# Patient Record
Sex: FEMALE | Race: WHITE | URBAN - METROPOLITAN AREA
[De-identification: names, ages, dates, MRNs, and addresses within clinical notes are randomized per-mention and may not be internally consistent; named-entity substitution may affect disease eponyms.]

---
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
## 2024-05-28 ENCOUNTER — OFFICE VISIT (OUTPATIENT)
Dept: INTERNAL MEDICINE CLINIC | Facility: CLINIC | Age: 19
End: 2024-05-28

## 2024-05-28 ENCOUNTER — TELEPHONE (OUTPATIENT)
Dept: INTERNAL MEDICINE CLINIC | Facility: CLINIC | Age: 19
End: 2024-05-28

## 2024-05-28 VITALS
HEIGHT: 66 IN | DIASTOLIC BLOOD PRESSURE: 72 MMHG | OXYGEN SATURATION: 99 % | SYSTOLIC BLOOD PRESSURE: 114 MMHG | BODY MASS INDEX: 21.97 KG/M2 | TEMPERATURE: 98 F | RESPIRATION RATE: 16 BRPM | HEART RATE: 76 BPM | WEIGHT: 136.69 LBS

## 2024-05-28 DIAGNOSIS — Z13.220 SCREENING FOR LIPID DISORDERS: ICD-10-CM

## 2024-05-28 DIAGNOSIS — R00.0 TACHYCARDIA: ICD-10-CM

## 2024-05-28 DIAGNOSIS — Z13.29 SCREENING FOR ENDOCRINE, METABOLIC AND IMMUNITY DISORDER: ICD-10-CM

## 2024-05-28 DIAGNOSIS — Z13.29 SCREENING FOR THYROID DISORDER: ICD-10-CM

## 2024-05-28 DIAGNOSIS — Z13.0 SCREENING FOR DEFICIENCY ANEMIA: ICD-10-CM

## 2024-05-28 DIAGNOSIS — Z00.00 ENCOUNTER FOR ANNUAL PHYSICAL EXAM: ICD-10-CM

## 2024-05-28 DIAGNOSIS — Z13.228 SCREENING FOR ENDOCRINE, METABOLIC AND IMMUNITY DISORDER: ICD-10-CM

## 2024-05-28 DIAGNOSIS — R00.2 PALPITATIONS: Primary | ICD-10-CM

## 2024-05-28 DIAGNOSIS — Z13.0 SCREENING FOR ENDOCRINE, METABOLIC AND IMMUNITY DISORDER: ICD-10-CM

## 2024-05-28 LAB
ATRIAL RATE: 68 BPM
P AXIS: 68 DEGREES
P-R INTERVAL: 158 MS
Q-T INTERVAL: 412 MS
QRS DURATION: 78 MS
QTC CALCULATION (BEZET): 438 MS
R AXIS: 89 DEGREES
T AXIS: 56 DEGREES
VENTRICULAR RATE: 68 BPM

## 2024-05-28 PROCEDURE — 93000 ELECTROCARDIOGRAM COMPLETE: CPT | Performed by: NURSE PRACTITIONER

## 2024-05-28 PROCEDURE — 99214 OFFICE O/P EST MOD 30 MIN: CPT | Performed by: NURSE PRACTITIONER

## 2024-05-28 NOTE — PROGRESS NOTES
CHIEF COMPLAINT:     Chief Complaint   Patient presents with    Palpitations     Occurring since April. Recently increasing in freq, past week. Experiencing every day at least once now. Sunday, occurred 4 times, \"major\" palpitations. Durations of palpitations is 5 mins, experiences nausea, lightheaded, tingling, and feeling shaky. Denies cp/sob associated with palpitations. Not currently lightheaded or experiencing palpitations. Currently exp nausea. Endorses her resting hr is more than usual ().    Nausea    Lightheadedness     Just started today       HPI:   Peggy Sprague is a 19 year old female accompanied by her mother coming in with complaints of palpitations and tachycardia.     Symptoms have been ongoing since April but more frequent in the past week. She is an avid swimmer and is pretty active. Reports her HR goes up to 120-130s when she is walking and she starts to feel palpitations. HR usually comes down within 5 minutes of resting. She does have associated lightheadedness and nausea with the palpitations. No chest pain, shortness of breath, syncope, or vomiting. Reports her resting HR is also higher about  and was previously around 60-70s. She was previously drinking 2-3 cups of coffee weekly but has cut that out completely now. Has been drinking more water which seems to help somewhat. Denies any increased stress or anxiety. 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     Past Medical History:    History of MRI    Right hip MRI    Perthes disease, right (HCC)      Past Surgical History:   Procedure Laterality Date    Legg perthes orthosis Right 2011, 2012, 2021    3 total surgeries      Social History:  Social History     Socioeconomic History    Marital status: Single   Occupational History    Occupation:    Tobacco Use    Smoking status: Never     Passive exposure: Never    Smokeless tobacco: Never   Vaping Use    Vaping status: Never Used   Substance and Sexual Activity     Alcohol use: Never    Drug use: Never   Other Topics Concern    Caffeine Concern No    Exercise No    Seat Belt No    Special Diet No    Stress Concern No    Weight Concern No      Family History:  Family History   Problem Relation Age of Onset    Breast Cancer Paternal Grandmother     Cancer Paternal Grandmother     Other (leukimia) Paternal Grandmother 62      Allergies:  No Known Allergies   Current Meds:  Current Outpatient Medications   Medication Sig Dispense Refill    Glucosamine-Chondroitin (GLUCOSAMINE CHONDR COMPLEX OR) Take 2 capsules by mouth daily.      Multiple Vitamins-Minerals (MULTI COMPLETE OR) Take by mouth.         Counseling given: Not Answered       REVIEW OF SYSTEMS:   See HPI.    EXAM:     /72 (BP Location: Left arm, Patient Position: Sitting, Cuff Size: adult)   Pulse 76   Temp 97.5 °F (36.4 °C) (Temporal)   Resp 16   Ht 5' 6\" (1.676 m)   Wt 136 lb 11.2 oz (62 kg)   LMP 05/01/2024   SpO2 99%   BMI 22.06 kg/m²   Body mass index is 22.06 kg/m².   Vital signs reviewed. Appears stated age, well groomed, in no acute distress.  Physical Exam:  GENERAL: Patient is alert, awake and oriented, well developed, well nourished.  HEENT: Head: Normocephalic, atraumatic.   NECK: Supple, no CLAD, no bruits.  HEART: RRR without murmur.  LUNGS: Clear to auscultation bilaterally, no rales/rhonchi/wheezing.  ABDOMEN: good BS's, no masses, HSM or tenderness  MUSCULOSKELETAL: No obvious joint deformity or swelling.   EXTREMITIES: No edema, no cyanosis, no clubbing, FROM  NEURO: Oriented time three..    LABS:      Lab Results   Component Value Date    WBC 5.4 06/14/2023    RBC 4.12 06/14/2023    HGB 12.2 06/14/2023    HCT 36.2 06/14/2023    MCV 87.9 06/14/2023    MCH 29.6 06/14/2023    MCHC 33.7 06/14/2023    RDW 12.4 06/14/2023    .0 06/14/2023      Lab Results   Component Value Date    GLU 88 06/14/2023    BUN 11 06/14/2023    CREATSERUM 0.71 06/14/2023    ANIONGAP 7 06/14/2023    GFRNAA 108  07/18/2022    GFRAA 124 07/18/2022    CA 9.8 06/14/2023    OSMOCALC 289 06/14/2023    ALKPHO 68 07/18/2022    AST 20 07/18/2022    ALT 21 07/18/2022    BILT 0.5 07/18/2022    TP 7.3 07/18/2022    ALB 4.0 07/18/2022    GLOBULIN 3.3 07/18/2022     06/14/2023    K 4.4 06/14/2023     06/14/2023    CO2 24.0 06/14/2023      No results found for: \"CHOLEST\", \"TRIG\", \"HDL\", \"LDL\", \"VLDL\", \"TCHDLRATIO\", \"NONHDLC\", \"CHOLHDLRATIO\", \"CALCNONHDL\"   Lab Results   Component Value Date    TSH 0.620 06/14/2023      No results found for: \"EAG\", \"A1C\"     IMAGING:     EKG 5/28/24: NSR w/ sinus arrhythmia. No acute ST changes, normal axis.     ASSESSMENT AND PLAN:   1. Palpitations  2. Tachycardia  -EKG as above  -Do holter and echo  -Do labs  -Avoid caffeine  -Drink at least 64-80 oz of water daily  -Go to ER for worsening palpitations, chest pain, or sob  - EKG with interpretation and Report -IN OFFICE [25143]  - CARD MONITOR HOLTER 48 HOUR (CPT=93225); Future  - CARD ECHO 2D DOPPLER (CPT=93306); Future  - MAGNESIUM [622][Q]    3. Encounter for annual physical exam  -Do fasting labs  - CBC With Differential With Platelet  - Comp Metabolic Panel  - Lipid Panel  - TSH W Reflex To Free T4    4. Screening for deficiency anemia  - CBC With Differential With Platelet    5. Screening for endocrine, metabolic and immunity disorder  - Comp Metabolic Panel    6. Screening for lipid disorders  - Lipid Panel    7. Screening for thyroid disorder  - TSH W Reflex To Free T4     The patient indicates understanding of these issues and agrees to the plan.  Return for as scheduled for physical or sooner as needed.    Christy Orantes, APRKVNG  5/28/2024

## 2024-05-28 NOTE — TELEPHONE ENCOUNTER
called after scheduling patient for \"Heart palpatations accompanied by nausea. spoke to patient. Occurring since April. Recently increasing in freq, past week. Experiencing every day at least once now. Sunday, occurred 4 times, \"major\" palpitations. Durations of palpitations is 5 mins, experiences nausea, lightheaded, tingling, and feeling shaky. Denies cp/sob associated with palpitations. Not currently lightheaded or experiencing palpitations. Currently exp nausea. Endorses her resting hr is more than usual (). Does not consume much caffeine. Patient coming in for appoinment. Advised to have someone drive her. Aware to go to er for worsening symptoms or any emergent matters from what we discussed. Fyi-thanks!    Future Appointments   Date Time Provider Department Center   5/28/2024  9:40 AM Christy Orantes APRN [facility]   6/18/2024 12:00 PM Dora Russell MD [facility]

## 2024-05-28 NOTE — PATIENT INSTRUCTIONS
Get your labs done. You should be fasting for at least 10 hours (you can drink water during fasting). If you take a multivitamin with Biotin or any biotin product it should be held for 3 days prior to getting your labs done. If you use [facility] labs, you may schedule at [phone] or on-line at [email or web address].     Get the holter and echocardiogram done.    Drink at least 64-80 oz of water.    Avoid any caffeine.    Go to ER for persistent palpitations, or persistent HR >120, chest pain, or shortness of breath.

## 2024-05-29 ENCOUNTER — LAB ENCOUNTER (OUTPATIENT)
Dept: LAB | Age: 19
End: 2024-05-29
Attending: NURSE PRACTITIONER
Payer: COMMERCIAL

## 2024-05-29 LAB
ALBUMIN SERPL-MCNC: 4.2 G/DL (ref 3.4–5)
ALBUMIN/GLOB SERPL: 1.3 {RATIO} (ref 1–2)
ALP LIVER SERPL-CCNC: 53 U/L
ALT SERPL-CCNC: 16 U/L
ANION GAP SERPL CALC-SCNC: 7 MMOL/L (ref 0–18)
AST SERPL-CCNC: 17 U/L (ref 15–37)
BASOPHILS # BLD AUTO: 0.02 X10(3) UL (ref 0–0.2)
BASOPHILS NFR BLD AUTO: 0.4 %
BILIRUB SERPL-MCNC: 0.6 MG/DL (ref 0.1–2)
BUN BLD-MCNC: 12 MG/DL (ref 9–23)
CALCIUM BLD-MCNC: 9.5 MG/DL (ref 8.5–10.1)
CHLORIDE SERPL-SCNC: 109 MMOL/L (ref 98–112)
CHOLEST SERPL-MCNC: 135 MG/DL (ref ?–200)
CO2 SERPL-SCNC: 23 MMOL/L (ref 21–32)
CREAT BLD-MCNC: 0.89 MG/DL
EGFRCR SERPLBLD CKD-EPI 2021: 95 ML/MIN/1.73M2 (ref 60–?)
EOSINOPHIL # BLD AUTO: 0.12 X10(3) UL (ref 0–0.7)
EOSINOPHIL NFR BLD AUTO: 2.2 %
ERYTHROCYTE [DISTWIDTH] IN BLOOD BY AUTOMATED COUNT: 12 %
FASTING PATIENT LIPID ANSWER: YES
FASTING STATUS PATIENT QL REPORTED: YES
GLOBULIN PLAS-MCNC: 3.3 G/DL (ref 2.8–4.4)
GLUCOSE BLD-MCNC: 95 MG/DL (ref 70–99)
HCT VFR BLD AUTO: 37 %
HDLC SERPL-MCNC: 51 MG/DL (ref 40–59)
HGB BLD-MCNC: 13 G/DL
IMM GRANULOCYTES # BLD AUTO: 0.01 X10(3) UL (ref 0–1)
IMM GRANULOCYTES NFR BLD: 0.2 %
LDLC SERPL CALC-MCNC: 69 MG/DL (ref ?–100)
LYMPHOCYTES # BLD AUTO: 1.96 X10(3) UL (ref 1–4)
LYMPHOCYTES NFR BLD AUTO: 36.5 %
MAGNESIUM SERPL-MCNC: 2.1 MG/DL (ref 1.6–2.6)
MCH RBC QN AUTO: 29.5 PG (ref 26–34)
MCHC RBC AUTO-ENTMCNC: 35.1 G/DL (ref 31–37)
MCV RBC AUTO: 83.9 FL
MONOCYTES # BLD AUTO: 0.48 X10(3) UL (ref 0.1–1)
MONOCYTES NFR BLD AUTO: 8.9 %
NEUTROPHILS # BLD AUTO: 2.78 X10 (3) UL (ref 1.5–7.7)
NEUTROPHILS # BLD AUTO: 2.78 X10(3) UL (ref 1.5–7.7)
NEUTROPHILS NFR BLD AUTO: 51.8 %
NONHDLC SERPL-MCNC: 84 MG/DL (ref ?–130)
OSMOLALITY SERPL CALC.SUM OF ELEC: 288 MOSM/KG (ref 275–295)
PLATELET # BLD AUTO: 189 10(3)UL (ref 150–450)
POTASSIUM SERPL-SCNC: 4.2 MMOL/L (ref 3.5–5.1)
PROT SERPL-MCNC: 7.5 G/DL (ref 6.4–8.2)
RBC # BLD AUTO: 4.41 X10(6)UL
SODIUM SERPL-SCNC: 139 MMOL/L (ref 136–145)
TRIGL SERPL-MCNC: 74 MG/DL (ref 30–149)
TSI SER-ACNC: 1.58 MIU/ML (ref 0.36–3.74)
VLDLC SERPL CALC-MCNC: 11 MG/DL (ref 0–30)
WBC # BLD AUTO: 5.4 X10(3) UL (ref 4–11)

## 2024-05-29 PROCEDURE — 80061 LIPID PANEL: CPT | Performed by: NURSE PRACTITIONER

## 2024-05-29 PROCEDURE — 80053 COMPREHEN METABOLIC PANEL: CPT | Performed by: NURSE PRACTITIONER

## 2024-05-29 PROCEDURE — 36415 COLL VENOUS BLD VENIPUNCTURE: CPT | Performed by: NURSE PRACTITIONER

## 2024-05-29 PROCEDURE — 83735 ASSAY OF MAGNESIUM: CPT | Performed by: NURSE PRACTITIONER

## 2024-05-29 PROCEDURE — 84443 ASSAY THYROID STIM HORMONE: CPT | Performed by: NURSE PRACTITIONER

## 2024-05-29 PROCEDURE — 85025 COMPLETE CBC W/AUTO DIFF WBC: CPT | Performed by: NURSE PRACTITIONER

## 2024-05-31 ENCOUNTER — OFFICE VISIT (OUTPATIENT)
Dept: INTERNAL MEDICINE CLINIC | Facility: CLINIC | Age: 19
End: 2024-05-31

## 2024-05-31 VITALS
BODY MASS INDEX: 22.02 KG/M2 | HEIGHT: 66 IN | SYSTOLIC BLOOD PRESSURE: 104 MMHG | WEIGHT: 137 LBS | DIASTOLIC BLOOD PRESSURE: 64 MMHG | RESPIRATION RATE: 14 BRPM | OXYGEN SATURATION: 99 % | TEMPERATURE: 97 F | HEART RATE: 68 BPM

## 2024-05-31 DIAGNOSIS — R00.2 PALPITATIONS: Primary | ICD-10-CM

## 2024-05-31 DIAGNOSIS — R00.0 TACHYCARDIA: ICD-10-CM

## 2024-05-31 DIAGNOSIS — R11.0 NAUSEA WITHOUT VOMITING: ICD-10-CM

## 2024-05-31 PROCEDURE — G2211 COMPLEX E/M VISIT ADD ON: HCPCS | Performed by: NURSE PRACTITIONER

## 2024-05-31 PROCEDURE — 99214 OFFICE O/P EST MOD 30 MIN: CPT | Performed by: NURSE PRACTITIONER

## 2024-05-31 RX ORDER — METOPROLOL SUCCINATE 25 MG/1
25 TABLET, EXTENDED RELEASE ORAL DAILY
Qty: 30 TABLET | Refills: 0 | Status: SHIPPED | OUTPATIENT
Start: 2024-05-31

## 2024-05-31 RX ORDER — DIMENHYDRINATE 50 MG
50 TABLET ORAL NIGHTLY PRN
COMMUNITY

## 2024-05-31 RX ORDER — ONDANSETRON 4 MG/1
4 TABLET, ORALLY DISINTEGRATING ORAL EVERY 8 HOURS PRN
Qty: 15 TABLET | Refills: 0 | Status: SHIPPED | OUTPATIENT
Start: 2024-05-31

## 2024-05-31 NOTE — PROGRESS NOTES
CHIEF COMPLAINT:     Chief Complaint   Patient presents with    Nausea     No vomiting, nausea constant and getting worse---3 weeks now. Has testing for palpitations scheduled. -had 1 episode Wed.        HPI:   Peggy Sprague is a 19 year old female coming in accompanied by her dad with complaints of persistent nausea.    Patient denies being pregnant, has never been sexually active. Was seen on 5/28 for palpitations and tachycardia. Has holter and echo scheduled for Tuesday. Had 1 episode of palpitation on 5/29, none since then. Still has tachycardia up to 120-130s with walking. Resting is better now around 60-80s. No chest pain, shortness of breath, or syncope. Still feels lightheaded occasionally. She is not drinking any caffeine now. Drinking lots of water. Denies any exercise or strenuous activity within the last few days.    Past Medical History:    History of MRI    Right hip MRI    Perthes disease, right (HCC)      Past Surgical History:   Procedure Laterality Date    Legg perthes orthosis Right 2011, 2012, 2021    3 total surgeries      Social History:  Social History     Socioeconomic History    Marital status: Single   Occupational History    Occupation: lifeguard   Tobacco Use    Smoking status: Never     Passive exposure: Never    Smokeless tobacco: Never   Vaping Use    Vaping status: Never Used   Substance and Sexual Activity    Alcohol use: Never    Drug use: Never   Other Topics Concern    Caffeine Concern No    Exercise No    Seat Belt No    Special Diet No    Stress Concern No    Weight Concern No      Family History:  Family History   Problem Relation Age of Onset    Breast Cancer Paternal Grandmother     Cancer Paternal Grandmother     Other (leukimia) Paternal Grandmother 62      Allergies:  No Known Allergies   Current Meds:  Current Outpatient Medications   Medication Sig Dispense Refill    dimenhyDRINATE 50 MG Oral Tab Take 1 tablet (50 mg total) by mouth nightly as needed.       metoprolol succinate ER 25 MG Oral Tablet 24 Hr Take 1 tablet (25 mg total) by mouth daily. 30 tablet 0    ondansetron 4 MG Oral Tablet Dispersible Take 1 tablet (4 mg total) by mouth every 8 (eight) hours as needed for Nausea. 15 tablet 0    Glucosamine-Chondroitin (GLUCOSAMINE CHONDR COMPLEX OR) Take 2 capsules by mouth daily.      Multiple Vitamins-Minerals (MULTI COMPLETE OR) Take by mouth.         Counseling given: Not Answered       REVIEW OF SYSTEMS:   See HPI.    EXAM:     /64 (BP Location: Right arm, Patient Position: Sitting, Cuff Size: adult)   Pulse 68   Temp 97.4 °F (36.3 °C) (Skin)   Resp 14   Ht 5' 6\" (1.676 m)   Wt 137 lb (62.1 kg)   LMP 05/01/2024   SpO2 99%   BMI 22.11 kg/m²   Body mass index is 22.11 kg/m².   Vital signs reviewed. Appears stated age, well groomed, in no acute distress.  Physical Exam:  GENERAL: Patient is alert, awake and oriented, well developed, well nourished.  HEENT: Head: Normocephalic, atraumatic.   NECK: Supple, no CLAD, no bruits.   HEART: RRR without murmur.  LUNGS: Clear to auscultation bilaterally, no rales/rhonchi/wheezing.  ABDOMEN: good BS's, no masses, HSM or tenderness  MUSCULOSKELETAL: No obvious joint deformity or swelling.   EXTREMITIES: No edema, no cyanosis, no clubbing, FROM.  NEURO: Oriented time three.     LABS:      Lab Results   Component Value Date    WBC 5.4 05/29/2024    RBC 4.41 05/29/2024    HGB 13.0 05/29/2024    HCT 37.0 05/29/2024    MCV 83.9 05/29/2024    MCH 29.5 05/29/2024    MCHC 35.1 05/29/2024    RDW 12.0 05/29/2024    .0 05/29/2024      Lab Results   Component Value Date    GLU 95 05/29/2024    BUN 12 05/29/2024    CREATSERUM 0.89 05/29/2024    ANIONGAP 7 05/29/2024    GFRNAA 108 07/18/2022    GFRAA 124 07/18/2022    CA 9.5 05/29/2024    OSMOCALC 288 05/29/2024    ALKPHO 53 05/29/2024    AST 17 05/29/2024    ALT 16 05/29/2024    BILT 0.6 05/29/2024    TP 7.5 05/29/2024    ALB 4.2 05/29/2024    GLOBULIN 3.3 05/29/2024      05/29/2024    K 4.2 05/29/2024     05/29/2024    CO2 23.0 05/29/2024      Lab Results   Component Value Date    CHOLEST 135 05/29/2024    TRIG 74 05/29/2024    HDL 51 05/29/2024    LDL 69 05/29/2024    VLDL 11 05/29/2024    NONHDLC 84 05/29/2024      Lab Results   Component Value Date    TSH 1.580 05/29/2024      No results found for: \"EAG\", \"A1C\"     IMAGING:     No results found.     ASSESSMENT AND PLAN:   1. Palpitations  2. Tachycardia  -She is still symptomatic  -Start metoprolol succinate 25 mg daily, side effects discussed. Hold 48 hours before her test  -Advised to monitor her HR closely and notify us if persistently <60  -Continue to drink adequate water  -Avoid any exercise for now  -Continue to avoid caffeine   - metoprolol succinate ER 25 MG Oral Tablet 24 Hr; Take 1 tablet (25 mg total) by mouth daily.  Dispense: 30 tablet; Refill: 0    3. Nausea without vomiting  -Take zofran prn. EKG done 5/28 with no QT prolongation   - ondansetron 4 MG Oral Tablet Dispersible; Take 1 tablet (4 mg total) by mouth every 8 (eight) hours as needed for Nausea.  Dispense: 15 tablet; Refill: 0     The patient indicates understanding of these issues and agrees to the plan.  Return for as scheduled or sooner as needed.    Christy Orantes, ISSA  5/31/2024

## 2024-06-04 ENCOUNTER — TELEPHONE (OUTPATIENT)
Dept: INTERNAL MEDICINE CLINIC | Facility: CLINIC | Age: 19
End: 2024-06-04

## 2024-06-04 ENCOUNTER — HOSPITAL ENCOUNTER (OUTPATIENT)
Dept: CV DIAGNOSTICS | Facility: HOSPITAL | Age: 19
Discharge: HOME | End: 2024-06-04
Attending: NURSE PRACTITIONER
Payer: COMMERCIAL

## 2024-06-04 DIAGNOSIS — R00.0 TACHYCARDIA: ICD-10-CM

## 2024-06-04 DIAGNOSIS — R00.2 PALPITATIONS: ICD-10-CM

## 2024-06-04 DIAGNOSIS — R11.0 NAUSEA WITHOUT VOMITING: ICD-10-CM

## 2024-06-04 PROCEDURE — 93226 XTRNL ECG REC<48 HR SCAN A/R: CPT | Performed by: NURSE PRACTITIONER

## 2024-06-04 PROCEDURE — 93306 TTE W/DOPPLER COMPLETE: CPT | Performed by: NURSE PRACTITIONER

## 2024-06-04 PROCEDURE — 93225 XTRNL ECG REC<48 HRS REC: CPT | Performed by: NURSE PRACTITIONER

## 2024-06-04 RX ORDER — ONDANSETRON 4 MG/1
4 TABLET, ORALLY DISINTEGRATING ORAL
Qty: 15 TABLET | Refills: 0 | Status: SHIPPED | OUTPATIENT
Start: 2024-06-04

## 2024-06-04 NOTE — TELEPHONE ENCOUNTER
Pt informed and verbalized understanding. Cardiac testing completed, advised we will be back in touch with results once reviewed by provider.

## 2024-06-04 NOTE — TELEPHONE ENCOUNTER
We can do a refill for 15 tablets. Advise her to take only as needed. Try seeing if taking once a day is sufficient. Thanks.

## 2024-06-04 NOTE — TELEPHONE ENCOUNTER
Was prescribed Zolfran earlier this week and has finished the prescription. Pt would like to know if she needs more or if that was enough. Please call her back    [phone]

## 2024-06-04 NOTE — TELEPHONE ENCOUNTER
Spoke to pt, she was only dispensed 9 tablets, not 15 but she has used all of these. She was taking twice a day, seemed to help symptoms a little but nausea still persistent. No vomiting or abd pain. She is wondering if she is to continue this as needed can she get refill? Please advise, thank you.

## 2024-06-11 ENCOUNTER — TELEPHONE (OUTPATIENT)
Dept: INTERNAL MEDICINE CLINIC | Facility: CLINIC | Age: 19
End: 2024-06-11

## 2024-06-11 DIAGNOSIS — R00.0 TACHYCARDIA: ICD-10-CM

## 2024-06-11 DIAGNOSIS — R00.2 PALPITATIONS: Primary | ICD-10-CM

## 2024-06-11 DIAGNOSIS — R94.31 ABNORMAL HOLTER EXAM: ICD-10-CM

## 2024-06-11 NOTE — TELEPHONE ENCOUNTER
Can we call cardiology to get holter monitor results? I recommend she follow up with cardiology as well. Please refer to [facility]. Thanks.

## 2024-06-11 NOTE — TELEPHONE ENCOUNTER
Discussed with Dr. Russell. She needs an appointment for a re-evaluation of the nausea and dizziness to r/o other etiologies other than palpitations and tachycardia contributing to her symptoms. The holter is not finalized yet. I looked at the preliminary results which shows sinus arrhythmia with mild sinus tachycardia that resolved abruptly. Rare PVC and PAC but nothing significant. There were no symptoms she recorded in the diary. She should see cardiology regardless. She has an upcoming appointment on 6/18 which can be changed to address this acute concern and she can reschedule the physical. If she does not want to reschedule the physical or would like to come in sooner, she can see Adilene or Dr. Russell as I am out of office from tomorrow and will be back Monday. If no availability with either, she can see Dr. Roper. Thanks.

## 2024-06-11 NOTE — TELEPHONE ENCOUNTER
Per Dr delarosa Ok to add on tomorrow to her schedule at 11am if pt is able to come. Left message to call back for pt and pt's mom.

## 2024-06-11 NOTE — TELEPHONE ENCOUNTER
Pt's mom calling with the concern for the pt. Pt has severe lightheaded, dizziness and nausea with no vomiting and sometimes palpitations. Zofran helping a little. Pt been having these systems for 6 months now. Pt waiting Holter monitor result.  Pt is laying down all the time and does not have energy. Please advise

## 2024-06-11 NOTE — TELEPHONE ENCOUNTER
Patient's mom notified.  verbalized understanding . Appointment made with Adilene. Cardio information given. Referral, all cardio tests  faxed to [facility]     Future Appointments   Date Time Provider Department Center   6/12/2024  7:40 AM Luisana Hurtado APRN [facility]   6/18/2024 12:00 PM Dora Russell MD [facility]

## 2024-06-12 ENCOUNTER — LAB ENCOUNTER (OUTPATIENT)
Dept: LAB | Age: 19
End: 2024-06-12
Attending: INTERNAL MEDICINE
Payer: COMMERCIAL

## 2024-06-12 ENCOUNTER — OFFICE VISIT (OUTPATIENT)
Dept: INTERNAL MEDICINE CLINIC | Facility: CLINIC | Age: 19
End: 2024-06-12
Payer: COMMERCIAL

## 2024-06-12 VITALS
OXYGEN SATURATION: 98 % | DIASTOLIC BLOOD PRESSURE: 66 MMHG | TEMPERATURE: 98 F | SYSTOLIC BLOOD PRESSURE: 110 MMHG | RESPIRATION RATE: 21 BRPM | WEIGHT: 134 LBS | BODY MASS INDEX: 21.53 KG/M2 | HEART RATE: 67 BPM | HEIGHT: 66 IN

## 2024-06-12 DIAGNOSIS — R11.0 NAUSEA: ICD-10-CM

## 2024-06-12 DIAGNOSIS — R42 LIGHTHEADEDNESS: Primary | ICD-10-CM

## 2024-06-12 LAB
FOLATE SERPL-MCNC: 20.9 NG/ML (ref 8.7–?)
VIT B12 SERPL-MCNC: 405 PG/ML (ref 193–986)
VIT D+METAB SERPL-MCNC: 43 NG/ML (ref 30–100)

## 2024-06-12 PROCEDURE — 99214 OFFICE O/P EST MOD 30 MIN: CPT | Performed by: INTERNAL MEDICINE

## 2024-06-12 PROCEDURE — 82607 VITAMIN B-12: CPT | Performed by: INTERNAL MEDICINE

## 2024-06-12 PROCEDURE — 36415 COLL VENOUS BLD VENIPUNCTURE: CPT | Performed by: INTERNAL MEDICINE

## 2024-06-12 PROCEDURE — 82306 VITAMIN D 25 HYDROXY: CPT | Performed by: INTERNAL MEDICINE

## 2024-06-12 PROCEDURE — 82746 ASSAY OF FOLIC ACID SERUM: CPT | Performed by: INTERNAL MEDICINE

## 2024-06-12 NOTE — PROGRESS NOTES
[facility]    CHIEF COMPLAINT:    Chief Complaint   Patient presents with    Dizziness     Dizzines    Follow - Up     Still has dizziness, nausea,lightheaded and sometimes Palpitation feels like Zofran is not wking for symptoms.Holter monitor result came back abnormal.         HISTORY OF PRESENT ILLNESS:  here for follow up.   Seen in May for palpitations and lightheadedness and then again for nausea. Symptoms have persisted.   Started in April with palpitations. Would occur randomly. Would cause lightheadedness but not severe. Lasted a few minutes and passed.   These resolved and then a few days later she came back with nausea.   Now with persistent nausea and lightheadedness.     Daily symptoms. Constant but worsen with some activities, she cannot really pinpoint what. Usually worse in the am. Not positional or exertional.   In the am she is often very lightheaded. Feels like she may pass out but has not. Gets better as the day goes on.   Yesterday was a bad day. She had to lie in bed all day for her symptoms. Today is better.   No vomiting.   No abdominal pain, diarrhea. No blood in bm.   No headache, vision changes, numbness, tingling, weakness, speech difficulty. No neck or back pain.     LMP 6/6/24, before that 5/1/24. Menses are regular.   No vaginal or urinary symptoms.     No sinus symptoms.     No joint or muscle aches. No unusual rashes.    Denies depression or anxiety.      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 Has not been swimming since she came home in May due to her symptoms. Doesn't feel comfortable being in the pool.     She had labs that were unremarkable.   Had a holter that was reviewed. Showed sinus tachycardia at times. She didn't have any palpitations so did not record any symptoms. She had nausea and lightheadedness but didn't know she was supposed to record those.     She has a cardiology appt later this month.     Orthostatics:   Supine: bp 108/62 p60  Sitting: bp 106/64 p63  Standing: bp  102/64 p75    REVIEW OF SYSTEMS:  See HPI    Current Medications:    Current Outpatient Medications   Medication Sig Dispense Refill    ondansetron 4 MG Oral Tablet Dispersible Take 1 tablet (4 mg total) by mouth daily as needed for Nausea. 15 tablet 0    dimenhyDRINATE 50 MG Oral Tab Take 1 tablet (50 mg total) by mouth nightly as needed.      Glucosamine-Chondroitin (GLUCOSAMINE CHONDR COMPLEX OR) Take 2 capsules by mouth daily.      Multiple Vitamins-Minerals (MULTI COMPLETE OR) Take by mouth.      metoprolol succinate ER 25 MG Oral Tablet 24 Hr Take 1 tablet (25 mg total) by mouth daily. 30 tablet 0       PAST MEDICAL, SOCIAL, AND FAMILY HISTORY:  Tobacco:    History   Smoking Status    Never   Smokeless Tobacco    Never       PHYSICAL EXAM:  /66 (BP Location: Left arm, Patient Position: Sitting, Cuff Size: adult)   Pulse 67   Temp 97.8 °F (36.6 °C)   Resp 21   Ht 5' 6\" (1.676 m)   Wt 134 lb (60.8 kg)   LMP 06/06/2024   SpO2 98%   BMI 21.63 kg/m²    GENERAL: well developed, well nourished,in no apparent distress  SKIN: no rashes,no suspicious lesions  HEENT: Oropharynx normal. No tonsillar enlargement or exudates. TMs normal bilaterally.   EYES:PERRLA, EOMI, conjunctiva are clear  NECK: no lad, supple, no carotid bruit  LUNGS: clear to auscultation  CARDIO: RRR without murmur  GI: good BS's,no masses, HSM or tenderness  MUSCULOSKELETAL: back is not tender,FROM of the back  EXTREMITIES: no cyanosis, clubbing or edema  NEURO: Oriented times three, cranial nerves are intact, motor and sensory are grossly intact, DTR's are 2+, strength is 5+/5 and sensation is intact to pinprick, and finger to nose, heel to shin and Rhomberg are all wnl's  Barb hallpike is negative.     DATA:  Results for orders placed or performed in visit on 05/28/24   Comp Metabolic Panel   Result Value Ref Range    Glucose 95 70 - 99 mg/dL    Sodium 139 136 - 145 mmol/L    Potassium 4.2 3.5 - 5.1 mmol/L    Chloride 109 98 - 112  mmol/L    CO2 23.0 21.0 - 32.0 mmol/L    Anion Gap 7 0 - 18 mmol/L    BUN 12 9 - 23 mg/dL    Creatinine 0.89 0.55 - 1.02 mg/dL    Calcium, Total 9.5 8.5 - 10.1 mg/dL    Calculated Osmolality 288 275 - 295 mOsm/kg    eGFR-Cr 95 >=60 mL/min/1.73m2    AST 17 15 - 37 U/L    ALT 16 13 - 56 U/L    Alkaline Phosphatase 53 52 - 144 U/L    Bilirubin, Total 0.6 0.1 - 2.0 mg/dL    Total Protein 7.5 6.4 - 8.2 g/dL    Albumin 4.2 3.4 - 5.0 g/dL    Globulin  3.3 2.8 - 4.4 g/dL    A/G Ratio 1.3 1.0 - 2.0    Patient Fasting for CMP? Yes    Lipid Panel   Result Value Ref Range    Cholesterol, Total 135 <200 mg/dL    HDL Cholesterol 51 40 - 59 mg/dL    Triglycerides 74 30 - 149 mg/dL    LDL Cholesterol 69 <100 mg/dL    VLDL 11 0 - 30 mg/dL    Non HDL Chol 84 <130 mg/dL    Patient Fasting for Lipid? Yes    TSH W Reflex To Free T4   Result Value Ref Range    TSH 1.580 0.358 - 3.740 mIU/mL   MAGNESIUM [622][Q]   Result Value Ref Range    Magnesium 2.1 1.6 - 2.6 mg/dL   EKG with interpretation and Report -IN OFFICE [29255]   Result Value Ref Range    Ventricular rate 68 BPM    Atrial rate 68 BPM    P-R Interval 158 ms    QRS Duration 78 ms    Q-T Interval 412 ms    QTC Calculation (Bezet) 438 ms    P Axis 68 degrees    R Axis 89 degrees    T Axis 56 degrees   CBC W/ DIFFERENTIAL   Result Value Ref Range    WBC 5.4 4.0 - 11.0 x10(3) uL    RBC 4.41 3.80 - 5.30 x10(6)uL    HGB 13.0 12.0 - 16.0 g/dL    HCT 37.0 35.0 - 48.0 %    .0 150.0 - 450.0 10(3)uL    MCV 83.9 80.0 - 100.0 fL    MCH 29.5 26.0 - 34.0 pg    MCHC 35.1 31.0 - 37.0 g/dL    RDW 12.0 %    Neutrophil Absolute Prelim 2.78 1.50 - 7.70 x10 (3) uL    Neutrophil Absolute 2.78 1.50 - 7.70 x10(3) uL    Lymphocyte Absolute 1.96 1.00 - 4.00 x10(3) uL    Monocyte Absolute 0.48 0.10 - 1.00 x10(3) uL    Eosinophil Absolute 0.12 0.00 - 0.70 x10(3) uL    Basophil Absolute 0.02 0.00 - 0.20 x10(3) uL    Immature Granulocyte Absolute 0.01 0.00 - 1.00 x10(3) uL    Neutrophil % 51.8 %     Lymphocyte % 36.5 %    Monocyte % 8.9 %    Eosinophil % 2.2 %    Basophil % 0.4 %    Immature Granulocyte % 0.2 %            ASSESSMENT AND PLAN:  1. Lightheadedness  Unclear etiology. Work up so far is negative.   Do carotid doppler and labs.   See neuro for evaluation.   - US CAROTID DOPPLER BILAT - DIAG IMG (CPT=93880); Future  - Vitamin B12 [E]  - Folic Acid Serum (Folate)  - VITAMIN D, 25-HYDROXY [60798][Q]  - NEURO - INTERNAL    2. Nausea  Unclear etiology. Doesn't sound like a gi symptom, seems to be more related to her lightheadedness.   Appetite not affected. Able to eat and drink everything.   Monitor.   Has zofran but doesn't really help. She can stop the zofran.   If neuro work up negative may need to consider gi work up.         Return in about 2 months (around 8/12/2024) for physical, needs it for school. .      Dora Russell MD

## 2024-06-13 ENCOUNTER — TELEPHONE (OUTPATIENT)
Dept: INTERNAL MEDICINE CLINIC | Facility: CLINIC | Age: 19
End: 2024-06-13

## 2024-06-13 DIAGNOSIS — R42 LIGHTHEADEDNESS: ICD-10-CM

## 2024-06-13 DIAGNOSIS — R11.0 NAUSEA WITHOUT VOMITING: ICD-10-CM

## 2024-06-13 DIAGNOSIS — R11.0 NAUSEA: Primary | ICD-10-CM

## 2024-06-13 RX ORDER — ONDANSETRON 4 MG/1
4 TABLET, ORALLY DISINTEGRATING ORAL
Qty: 15 TABLET | Refills: 0 | Status: SHIPPED | OUTPATIENT
Start: 2024-06-13 | End: 2024-07-01

## 2024-06-13 NOTE — TELEPHONE ENCOUNTER
She can try the zofran and see if it helps her nausea now that she is having it. If it is persistent and she is not feeling better, then go to the ER. Maybe able to expedite care there as well for CT.

## 2024-06-13 NOTE — TELEPHONE ENCOUNTER
Spoke to pt regarding labs, she was seen by  yesterday and advised to stop zofran as pt didn't seem to see improvement with it and to do 1/2 tablet of metoprolol in case the HR is contributing to symptoms. She took this around 8:30 after breakfast, since then she has had increased nausea, HR around the 90s. She is continuing to monitor HR. She is open to trying zofran again if warranted, she is getting a CT and further labs for workup as well. /Christy out of office currently which she understands, please advise if able. Thank you!!

## 2024-06-13 NOTE — TELEPHONE ENCOUNTER
Patients Marcelo woods [phone] states for CT ABDOMEN+PELVIS(CONTRAST ONLY)(CPT=74177) ([id number]) [[id number]] (Order [id number]) scan is it hydration or dehydration.

## 2024-06-13 NOTE — TELEPHONE ENCOUNTER
Rec call back from central scheduling supervisor ( non clinical). She believes the question patient rec when scheduling was related to iv fluid hydration. Advised to call back and schedule and let them no this is done without iv fluid hydration. Fyi-thanks!

## 2024-06-14 ENCOUNTER — LAB ENCOUNTER (OUTPATIENT)
Dept: LAB | Age: 19
End: 2024-06-14
Attending: INTERNAL MEDICINE
Payer: COMMERCIAL

## 2024-06-14 LAB — CORTIS SERPL-MCNC: 23.8 UG/DL

## 2024-06-14 PROCEDURE — 36415 COLL VENOUS BLD VENIPUNCTURE: CPT | Performed by: INTERNAL MEDICINE

## 2024-06-14 PROCEDURE — 82533 TOTAL CORTISOL: CPT | Performed by: INTERNAL MEDICINE

## 2024-06-22 ENCOUNTER — HOSPITAL ENCOUNTER (OUTPATIENT)
Dept: ULTRASOUND IMAGING | Age: 19
Discharge: HOME | End: 2024-06-22
Attending: INTERNAL MEDICINE

## 2024-06-22 DIAGNOSIS — R42 LIGHTHEADEDNESS: ICD-10-CM

## 2024-06-22 PROCEDURE — 93880 EXTRACRANIAL BILAT STUDY: CPT | Performed by: INTERNAL MEDICINE

## 2024-06-25 ENCOUNTER — HOSPITAL ENCOUNTER (OUTPATIENT)
Dept: CT IMAGING | Age: 19
Discharge: HOME | End: 2024-06-25
Attending: INTERNAL MEDICINE

## 2024-06-25 DIAGNOSIS — R11.0 NAUSEA: ICD-10-CM

## 2024-06-25 PROCEDURE — 74177 CT ABD & PELVIS W/CONTRAST: CPT | Performed by: INTERNAL MEDICINE

## 2024-07-01 DIAGNOSIS — R11.0 NAUSEA WITHOUT VOMITING: ICD-10-CM

## 2024-07-01 NOTE — TELEPHONE ENCOUNTER
Is this medication prescribed by the [facility] Providers? yes    Did the patient contact the pharmacy directly?:  no    Is patient out of meds or supply very low?:  2 pills    Medication Requested:  ondansetron 4 MG Oral Tablet Dispersible     Dose:      Is patient requesting a 30 or 90 day supply?:      Pharmacy name and phone # or location:    [id number] - [place], [place] - [address] AT [facility] [address], [phone], [phone] [address] [facility] [address]   Phone: [phone] Fax: [phone]       Is the patient due for an appointment?: no  (if so, please schedule appt)    Additional Notes:      Please advise the patient refills take up to 72 business hours.

## 2024-07-02 RX ORDER — ONDANSETRON 4 MG/1
4 TABLET, ORALLY DISINTEGRATING ORAL
Qty: 15 TABLET | Refills: 0 | Status: SHIPPED | OUTPATIENT
Start: 2024-07-02

## 2024-07-02 NOTE — TELEPHONE ENCOUNTER
Last OV relevant to medication: 6/12/24  Last refill date: 6/13/24 #15/refills:   When pt was asked to return for OV: 8/12/24  Upcoming appt/reason:   Future Appointments   Date Time Provider Department Center   7/31/2024  8:40 AM Tristin Heath MD [facility] [place]   8/5/2024  2:40 PM Dora Russell MD [facility] [place]   Was pt informed of any over due labs: utd

## 2024-07-16 ENCOUNTER — ORDER TRANSCRIPTION (OUTPATIENT)
Dept: ADMINISTRATIVE | Facility: HOSPITAL | Age: 19
End: 2024-07-16

## 2024-07-16 DIAGNOSIS — R42 LIGHTHEADEDNESS: Primary | ICD-10-CM

## 2024-07-16 DIAGNOSIS — R11.0 NAUSEA: ICD-10-CM

## 2024-07-16 DIAGNOSIS — R00.2 PALPITATIONS: ICD-10-CM

## 2024-07-17 ENCOUNTER — TELEPHONE (OUTPATIENT)
Dept: INTERNAL MEDICINE CLINIC | Facility: CLINIC | Age: 19
End: 2024-07-17

## 2024-07-17 NOTE — TELEPHONE ENCOUNTER
Future Appointments   Date Time Provider Department Center   7/31/2024  8:40 AM Tristin Heath MD [facility] [place]   8/5/2024  2:40 PM Dora Russell MD [facility] [place]   8/21/2024  2:00 PM  [facility]  [facility]     left detailed message for the pt

## 2024-07-17 NOTE — TELEPHONE ENCOUNTER
Reviewed cardiology notes.   Looks like pt with worsening symptoms and now with arreola as well. Cardiology ordered testing to rule out PE. I'm seeing her test scheduled for 8/21/24 this is more than a month ago. I would not recommend waiting this long for the test. Recommend she reach out to cardiology to see if they can get her scheduled sooner.   Please inform pt.

## 2024-07-19 NOTE — TELEPHONE ENCOUNTER
She already saw the cardio specialist. They ordered a test for her which is scheduled for 8/21/24. If she cannot get this test done sooner than that she should reach back out to the cardiologist to see if they can get her in sooner for the testing as they likely do not want her to wait for a month for this test. Please speak to pt and explain this to her.

## 2024-07-31 ENCOUNTER — OFFICE VISIT (OUTPATIENT)
Dept: NEUROLOGY | Facility: CLINIC | Age: 19
End: 2024-07-31
Payer: COMMERCIAL

## 2024-07-31 ENCOUNTER — TELEPHONE (OUTPATIENT)
Dept: NEUROLOGY | Facility: CLINIC | Age: 19
End: 2024-07-31

## 2024-07-31 VITALS
SYSTOLIC BLOOD PRESSURE: 96 MMHG | RESPIRATION RATE: 16 BRPM | BODY MASS INDEX: 21.53 KG/M2 | HEART RATE: 78 BPM | WEIGHT: 134 LBS | DIASTOLIC BLOOD PRESSURE: 56 MMHG | HEIGHT: 66 IN

## 2024-07-31 DIAGNOSIS — R00.2 PALPITATIONS: ICD-10-CM

## 2024-07-31 DIAGNOSIS — R55 POSTURAL DIZZINESS WITH PRESYNCOPE: ICD-10-CM

## 2024-07-31 DIAGNOSIS — R20.2 PARESTHESIAS: Primary | ICD-10-CM

## 2024-07-31 DIAGNOSIS — R42 DIZZINESS AND GIDDINESS: ICD-10-CM

## 2024-07-31 DIAGNOSIS — R55 POSTURAL DIZZINESS WITH PRESYNCOPE: Primary | ICD-10-CM

## 2024-07-31 DIAGNOSIS — R42 POSTURAL DIZZINESS WITH PRESYNCOPE: Primary | ICD-10-CM

## 2024-07-31 DIAGNOSIS — R11.0 NAUSEA: ICD-10-CM

## 2024-07-31 DIAGNOSIS — R42 POSTURAL DIZZINESS WITH PRESYNCOPE: ICD-10-CM

## 2024-07-31 PROCEDURE — 99204 OFFICE O/P NEW MOD 45 MIN: CPT | Performed by: OTHER

## 2024-07-31 NOTE — PROGRESS NOTES
Orthostatics completed per Dr. Heath    Supine: /62                P 73  Sitting: /64   P 78  Standing: BP 92/60   P 92    Pt noted lightheadedness w/ supine to sitting

## 2024-07-31 NOTE — PATIENT INSTRUCTIONS
Refill policies:    Allow 2-3 business days for refills; controlled substances may take longer.  Contact your pharmacy at least 5 days prior to running out of medication and have them send an electronic request or submit request through the “request refill” option in your [employer] account.  Refills are not addressed on weekends; covering physicians do not authorize routine medications on weekends.  No narcotics or controlled substances are refilled after noon on Fridays or by on call physicians.  By law, narcotics must be electronically prescribed.  A 30 day supply with no refills is the maximum allowed.  If your prescription is due for a refill, you may be due for a follow up appointment.  To best provide you care, patients receiving routine medications need to be seen at least once a year.  Patients receiving narcotic/controlled substance medications need to be seen at least once every 3 months.  In the event that your preferred pharmacy does not have the requested medication in stock (e.g. Backordered), it is your responsibility to find another pharmacy that has the requested medication available.  We will gladly send a new prescription to that pharmacy at your request.    Scheduling Tests:    If your physician has ordered radiology tests such as MRI or CT scans, please contact Central Scheduling at [phone] right away to schedule the test.  Once scheduled, the [facility] Centralized Referral Team will work with your insurance carrier to obtain pre-certification or prior authorization.  Depending on your insurance carrier, approval may take 3-10 days.  It is highly recommended patients assure they have received an authorization before having a test performed.  If test is done without insurance authorization, patient may be responsible for the entire amount billed.      Precertification and Prior Authorizations:  If your physician has recommended that you have a procedure or additional testing performed the [facility]  Centralized Referral Team will contact your insurance carrier to obtain pre-certification or prior authorization.    You are strongly encouraged to contact your insurance carrier to verify that your procedure/test has been approved and is a COVERED benefit.  Although the [facility] Centralized Referral Team does its due diligence, the insurance carrier gives the disclaimer that \"Although the procedure is authorized, this does not guarantee payment.\"    Ultimately the patient is responsible for payment.   Thank you for your understanding in this matter.  Paperwork Completion:  If you require FMLA or disability paperwork for your recovery, please make sure to either drop it off or have it faxed to our office at [phone]. Be sure the form has your name and date of birth on it.  The form will be faxed to our Forms Department and they will complete it for you.  There is a 25$ fee for all forms that need to be filled out.  Please be aware there is a 10-14 day turnaround time.  You will need to sign a release of information (CHRISTOPHER) form if your paperwork does not come with one.  You may call the Forms Department with any questions at [phone].  Their fax number is [phone].

## 2024-07-31 NOTE — TELEPHONE ENCOUNTER
Patient mother calling please let  know that Dr. Felton Julian office have the service for Tilt Test.    Please placed an order and fax it at [phone]  When done can we pleases call mother to let her know it was fax so she can call and make the appointment thanks.

## 2024-07-31 NOTE — H&P
[facility] New Patient / Consult Visit    Peggy Sprague is a 19 year old female.                         Referring MD: Dora Russell    Chief Complaint   Patient presents with    Neurologic Problem     Referred by PCP, C/O palpitations/dizziness/lightheaded/\"shakey\"/nausea, notes Sx's related to orthostatic changes, LOV Cards feels \"not cardiac related\"    Test Results     Echo 6/4/24, Holter 6/8/24, US Carotid 6/22       HPI:    Peggy Sprague is a 19 year old, who presents for evaluation of dizziness/ light headed sensation, palpitations, and nausea.    Patient states starting May 2024 she began to notice palpitations at rest when she was sitting in class.  This was also associated with some nausea but progressed since she moved back home to the point she is having frequent light headed sensation along with feeling like she is going to pass out when she stands up or when she is sitting up; this improves slightly if gets up slower.  She notes heart rate may get up to \"120\" when walking to bathroom and her resting heart rate is in the 60s or 70s..     She also endorses frequent nausea and light headed sensation.   She denies balance issues or numbness in feet but has occasional tingling in hands. She denies history of concussion or waking in AM having wet the bed or bitten her tongue. She has intermittent aching over the outside of the chest; denies bowel / bladder incontinence; denies headaches upon standing or with exertion.   Otherwise, patient denies any recent weight change, fevers, chills, nausea, double vision/ blurry vision / loss of vision, chest pain, palpitations, shortness of breath, rashes, joint pains, bowel / bladder incontinence or mood issues.     Past Medical History:    History of MRI    Right hip MRI    Perthes disease, right (HCC)     Past Surgical History:   Procedure Laterality Date    Legg perthes orthosis Right 2011, 2012, 2021    3 total surgeries     Social History      Socioeconomic History    Marital status: Single   Occupational History    Occupation: lifeguard   Tobacco Use    Smoking status: Never     Passive exposure: Never    Smokeless tobacco: Never   Vaping Use    Vaping status: Never Used   Substance and Sexual Activity    Alcohol use: Never    Drug use: Never   Other Topics Concern    Caffeine Concern No    Exercise No    Seat Belt No    Special Diet No    Stress Concern No    Weight Concern No     Family History   Problem Relation Age of Onset    Breast Cancer Paternal Grandmother     Cancer Paternal Grandmother     Other (leukimia) Paternal Grandmother 62       Allergies:  No Known Allergies   Current Meds:  Current Outpatient Medications   Medication Sig Dispense Refill    dimenhyDRINATE 50 MG Oral Tab Take 1 tablet (50 mg total) by mouth nightly as needed.      Glucosamine-Chondroitin (GLUCOSAMINE CHONDR COMPLEX OR) Take 2 capsules by mouth daily.      Multiple Vitamins-Minerals (MULTI COMPLETE OR) Take by mouth.            ROS:   A comprehensive 10 point review of systems was completed.  Pertinent positives and negatives noted in the the HPI.      PHYSICAL EXAM:   BP 96/56 (BP Location: Left arm, Patient Position: Sitting, Cuff Size: adult)   Pulse 78   Resp 16   Ht 66\"   Wt 134 lb (60.8 kg)   LMP 07/04/2024 (Exact Date)   BMI 21.63 kg/m²   Estimated body mass index is 21.63 kg/m² as calculated from the following:    Height as of this encounter: 66\".    Weight as of this encounter: 134 lb (60.8 kg).    GENERAL: well developed, well nourished, in no apparent distress  SKIN: no rashes  EYES: sclera anicteric, conjunctiva normal  HEENT: normocephalic  CARDIOVASCULAR: S1, S2 normal, RRR  LUNGS: clear to auscultation bilaterally  EXTREMITIES: no cyanosis, peripheral pulses intact    Neck: Supple; full range of motion; no carotid bruits    Mental status:  Alert and oriented to time, place, person, and situation  Speech: fluent  Language: normal naming,  repetition, and comprehension  Memory: normal  Attention/concentration: normal    Fundoscopic Exam: optic discs sharp bilaterally    Cranial Nerves: II through XII  Optic:    Pupils: equally round and reactive to light with direct and consensual responses, normal accomodation   Visual acuity: Normal              Visual fields: Normal  Oculomotor/Trochlear/Abducens:    Eye Movements: EOMI without nystagmus  Trigeminal:   Facial sensation:intact to light touch bilaterally  Facial:   Smile symmetric, eyebrow raise symmetric  Vestibulocochlear:   Hearing: normal bilaterally  Glossopharyngeal/Vagus:   Palate elevates symmetrically with midline uvula  Spinal accessory:   Shoulder Shrug: normal bilaterally   Lateral head turn: normal bilaterally  Hypoglossal:   Tongue movement: protrusion is midline with normal lateral movements    Motor System:  Strength: 5/5 throughout  Tone: normal    Sensory:  Pin is normal  Vibration is normal  Proprioception is normal  Romberg is absent    Coordination:  Finger to nose normal bilaterally  Rapid alternating movements normal bilaterally  Heel to shin is normal bilaterally    DTRs:   2+, symmetric, throughout, toes downgoing biaterally; no clonus          Gait:  Normal casual, heel, toe and tandem gait    TEST RESULTS/DATA REVIEWED:   Reviewed in EMR    IMPRESSION AND PLAN:   Peggy Sprague is a 19 year old female who presents for evaluation of dizziness/ light headed sensation, palpitations, and nausea.    Patient states starting May 2024 she began to notice palpitations at rest when she was sitting in class.  This was also associated with some nausea but progressed since she moved back home to the point she is having frequent light headed sensation along with feeling like she is going to pass out when she stands up or when she is sitting up; this improves slightly if gets up slower.  She notes heart rate may get up to \"120\" when walking to bathroom and her resting heart rate is in  the 60s or 70s..     She also endorses frequent nausea and light headed sensation.   She denies balance issues or numbness in feet but has occasional tingling in hands. She denies history of concussion or waking in AM having wet the bed or bitten her tongue. She has intermittent aching over the outside of the chest; denies bowel / bladder incontinence; denies headaches upon standing or with exertion.       Neurologic exam is normal and non-focal.     Overall, symptoms concerning for cardiac etiology vs autonomic dysfunction vs isolated postural tacyhycardic syndrome. Cardiac workup has been unremarkable. She could have symptoms of small fiber neuropathy and seizure is also in differential; will check NCS/EMG to rule out atypical neuropathy. Given dizziness and presyncope, will check MRI brain / MRA head/neck to evaluate for possible secondary pathology / demyelinating changes or vascular stenosis; check EEG to rule out seizures.      In addition, with suspected POTS syndrome, diagnostic workup would include tilt table test and /or autonomic testing; advised patient to discuss with cardiology workup and will give referral to autonomic neuropathy specialist Dr. Manzo as well.      1. Paresthesias  As noted above   - MRI BRAIN MRA BRAIN+MRA NECK (ALL W+WO) (CPT=70553/04447/10013); Future  - EEG; Future  - EMG ([facility]); Future  - Refer to Neurology    2. Dizziness and giddiness  As noted above   - MRI BRAIN MRA BRAIN+MRA NECK (ALL W+WO) (CPT=70553/58817/37470); Future  - EEG; Future  - Refer to Neurology    3. Palpitations  As noted above   - MRI BRAIN MRA BRAIN+MRA NECK (ALL W+WO) (CPT=70553/42642/10258); Future  - EEG; Future  - Refer to Neurology    4. Nausea  As noted above   - MRI BRAIN MRA BRAIN+MRA NECK (ALL W+WO) (CPT=70553/82049/53365); Future  - EEG; Future  - Refer to Neurology    5. Postural dizziness with presyncope  As noted above   - MRI BRAIN MRA BRAIN+MRA NECK (ALL W+WO)  (CPT=70553/27183/79201); Future  - Refer to Neurology    Copy of note was sent to referring physician.      No follow-ups on file.    Tristin Heath MD, Neurology  [facility]  Pager [phone]  7/31/2024

## 2024-08-02 NOTE — TELEPHONE ENCOUNTER
Tilt table ordered in EPIC - order is asking who will be performing; can we discuss how to order with cardiology office (Dr. Ya)

## 2024-08-05 ENCOUNTER — OFFICE VISIT (OUTPATIENT)
Dept: INTERNAL MEDICINE CLINIC | Facility: CLINIC | Age: 19
End: 2024-08-05
Payer: COMMERCIAL

## 2024-08-05 VITALS
HEIGHT: 66 IN | TEMPERATURE: 98 F | SYSTOLIC BLOOD PRESSURE: 98 MMHG | DIASTOLIC BLOOD PRESSURE: 68 MMHG | RESPIRATION RATE: 12 BRPM | BODY MASS INDEX: 21.26 KG/M2 | HEART RATE: 72 BPM | WEIGHT: 132.31 LBS

## 2024-08-05 DIAGNOSIS — R00.0 TACHYCARDIA: ICD-10-CM

## 2024-08-05 DIAGNOSIS — Z00.00 PHYSICAL EXAM, ANNUAL: Primary | ICD-10-CM

## 2024-08-05 DIAGNOSIS — Z23 NEED FOR VACCINATION: ICD-10-CM

## 2024-08-05 NOTE — TELEPHONE ENCOUNTER
Spoke to representative at Dr Eliel Ya's office.  They will need Tilt Table Order and referral to Dr Quinteros made by Dr Ya.  Order faxed to [phone] with confirmation.    Attempted to contact patient and mother via phone with no answer and no voicemail.  Sent [employer] message to inform. Last accessed on 8/3/2024.

## 2024-08-05 NOTE — PROGRESS NOTES
[facility]    CHIEF COMPLAINT:   Chief Complaint   Patient presents with    Routine Physical         HPI:   Peggy Sprague is a 19 year old female who presents for a complete physical exam. Symptoms: denies discharge, itching, burning or dysuria.     Tdap due. Done today.     Not due for pap.   She has never been sexually active.     She needs clearance for sports.     She is feeling a lot better since her last visit with me.     She saw cardiology a month ago and was still feeling pretty bad then.   They ordered a test for pulmonary embolism and a possible stress test after that.   The pulmonary embolism test is scheduled at the end of August. Per pt this was the earliest they could get in.     She also saw neuro last week. She was still having some symptoms then but was a bit better.   Has a lot of testing ordered by neuro. Eeg, emg, mri/mra brain and neck, tilt table test    Today she feels much better, almost back to normal. She is back to swimming and coaching daily. She has not had much lightheadedness other than if she gets up or changes position too quickly and it is short lived. No palpitations.     Here with father today. They are feeling a bit overwhelmed due to the amount of testing that has been ordered and the cost. Also not sure if they want to do all this testing seeing as she is feeling better.   She does need clearance to go back to swimming at school.       Wt Readings from Last 6 Encounters:   08/05/24 132 lb 4.8 oz (60 kg)   07/31/24 134 lb (60.8 kg)   06/12/24 134 lb (60.8 kg)   05/31/24 137 lb (62.1 kg)   05/28/24 136 lb 11.2 oz (62 kg)   06/14/23 139 lb (63 kg) (70%, Z= 0.53)*     * Growth percentiles are based on CDC (Girls, 2-20 Years) data.     Body mass index is 21.35 kg/m².       Current Outpatient Medications   Medication Sig Dispense Refill    Glucosamine-Chondroitin (GLUCOSAMINE CHONDR COMPLEX OR) Take 2 capsules by mouth. Takes several times a week      Multiple  Vitamins-Minerals (MULTI COMPLETE OR) Take by mouth daily.        Past Medical History:    History of MRI    Right hip MRI    Perthes disease, right (HCC)      Past Surgical History:   Procedure Laterality Date    Legg perthes orthosis Right 2011, 2012, 2021    3 total surgeries      Family History   Problem Relation Age of Onset    Breast Cancer Paternal Grandmother     Cancer Paternal Grandmother     Other (leukimia) Paternal Grandmother 62      Social History:   Social History     Socioeconomic History    Marital status: Single   Occupational History    Occupation:    Tobacco Use    Smoking status: Never     Passive exposure: Never    Smokeless tobacco: Never   Vaping Use    Vaping status: Never Used   Substance and Sexual Activity    Alcohol use: Never    Drug use: Never   Other Topics Concern    Caffeine Concern No    Exercise No    Seat Belt No    Special Diet No    Stress Concern No    Weight Concern No     Occ: student. : single. Children: no.   Exercise: swimming.  Diet: watches calories closely     REVIEW OF SYSTEMS:   GENERAL: feels well otherwise  SKIN: denies any unusual skin lesions  EYES:denies blurred vision or double vision  HEENT: denies nasal congestion, sinus pain or ST  LUNGS: denies shortness of breath with exertion, has resolved per pt  CARDIOVASCULAR: denies chest pain on exertion  GI: denies abdominal pain,denies heartburn  : denies dysuria, vaginal discharge or itching  MUSCULOSKELETAL: denies back pain  NEURO: denies headaches  PSYCHE: denies depression or anxiety  HEMATOLOGIC: denies hx of anemia  ENDOCRINE: denies thyroid history  ALL/ASTHMA: denies hx of allergy or asthma    EXAM:   BP 98/68 (BP Location: Right arm, Patient Position: Sitting, Cuff Size: adult)   Pulse 72   Temp 97.8 °F (36.6 °C) (Temporal)   Resp 12   Ht 5' 6\" (1.676 m)   Wt 132 lb 4.8 oz (60 kg)   LMP 07/04/2024 (Exact Date)   Breastfeeding No   BMI 21.35 kg/m²   Body mass index is 21.35  kg/m².   GENERAL: well developed, well nourished,in no apparent distress  SKIN: no rashes,no suspicious lesions  HEENT: atraumatic, normocephalic,ears and throat are clear  EYES:PERRLA, conjunctiva are clear  NECK: supple,no adenopathy,no bruits  CHEST: no chest tenderness  LUNGS: clear to auscultation  CARDIO: nl s1 and s2, RRR without murmur  GI: good BS's,no masses, HSM or tenderness  BREAST: deferred given age  GENITAL/URINARY:  deferred  MUSCULOSKELETAL: back is not tender,FROM of the back  EXTREMITIES: no cyanosis, clubbing or edema  NEURO: Oriented times three,cranial nerves are intact,motor and sensory are grossly intact    Labs:   Lab Results   Component Value Date/Time    WBC 5.4 05/29/2024 08:02 AM    HGB 13.0 05/29/2024 08:02 AM    .0 05/29/2024 08:02 AM      Lab Results   Component Value Date/Time    GLU 95 05/29/2024 08:02 AM     05/29/2024 08:02 AM    K 4.2 05/29/2024 08:02 AM     05/29/2024 08:02 AM    CO2 23.0 05/29/2024 08:02 AM    CREATSERUM 0.89 05/29/2024 08:02 AM    CA 9.5 05/29/2024 08:02 AM    ALB 4.2 05/29/2024 08:02 AM    TP 7.5 05/29/2024 08:02 AM    ALKPHO 53 05/29/2024 08:02 AM    AST 17 05/29/2024 08:02 AM    ALT 16 05/29/2024 08:02 AM    BILT 0.6 05/29/2024 08:02 AM    TSH 1.580 05/29/2024 08:02 AM        Lab Results   Component Value Date/Time    CHOLEST 135 05/29/2024 08:02 AM    HDL 51 05/29/2024 08:02 AM    TRIG 74 05/29/2024 08:02 AM    LDL 69 05/29/2024 08:02 AM    NONHDLC 84 05/29/2024 08:02 AM       No results found for: \"A1C\"   Vitamin D:    Lab Results   Component Value Date    VITD 43.0 06/12/2024           ASSESSMENT AND PLAN:   Peggy Carol Heetland is a 19 year old female who presents for a complete physical exam.   1. Physical exam, annual  Normal physical exam.   Tdap today.   She is exercising regularly again.     2. Need for vaccination  - TdaP (Adacel, Boostrix) [61998]    3. Tachycardia   D dimer ordered.     Discussed her previous symptoms and  need for testing.   Advised that she should complete the cardiac work up. Will check d dimer today. If positive will order cta chest.   She should see cardiology again and discuss need for stress test given her symptoms are improved and see if they have any contraindications to her participating in sports.   Reviewed neuro notes. Recommend she do the mri/mra of the brain to rule out any aneurysm. Emg, eeg, tilt table test can be placed on hold as suspicion for seizures is low and she is feeling better. If she has recurrence of symptoms she will then need all these tests.   She will work on these. If she decides not to do the mri/mra of the brain she is aware she may have a brain pathology including an aneurysm which is undiagnosed and which may put her at risk of adverse health effects including death.   Father present for visit today as well and both father and pt verbalized understanding.       Return in about 1 year (around 8/5/2025) for physical.  Form for clearance holding in folder until she gets the cardiac testing completed.       Dora Russell MD

## 2024-08-06 ENCOUNTER — LAB ENCOUNTER (OUTPATIENT)
Dept: LAB | Age: 19
End: 2024-08-06
Attending: INTERNAL MEDICINE
Payer: COMMERCIAL

## 2024-08-06 LAB — D DIMER PPP FEU-MCNC: 0.29 UG/ML FEU (ref ?–0.5)

## 2024-08-06 PROCEDURE — 85379 FIBRIN DEGRADATION QUANT: CPT | Performed by: INTERNAL MEDICINE

## 2024-08-06 PROCEDURE — 36415 COLL VENOUS BLD VENIPUNCTURE: CPT | Performed by: INTERNAL MEDICINE

## 2024-08-07 ENCOUNTER — TELEPHONE (OUTPATIENT)
Dept: INTERNAL MEDICINE CLINIC | Facility: CLINIC | Age: 19
End: 2024-08-07

## 2024-08-07 NOTE — TELEPHONE ENCOUNTER
Patient had her physical with Dr Russell on August 5th and she ordered D-Dimer which came back showing no significant abnormalities.  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  Please advise.  Thank you!

## 2024-08-07 NOTE — TELEPHONE ENCOUNTER
Pt has d-dimer completed, form is in 's holding folder. Are you able to sign or hold for ? Please advise, thanks!

## 2024-08-09 NOTE — TELEPHONE ENCOUNTER
Miriam on [facility] informed that  will return on Monday and be able to advise if additional testing is needed and sign form if appropriate.

## 2024-08-11 NOTE — TELEPHONE ENCOUNTER
We had discussed doing the mri/mra ordered by neuro and also reaching out to cardiology to see what more they need since the d sanjeevmer is negative. She should complete these.

## 2024-08-12 NOTE — TELEPHONE ENCOUNTER
Patient's mother said the cardiologist cancelled patient  stress test that was scheduled for tomorrow.  He now wants patient to do the CT gated heart (pulmonary vein) before the stress test.  The CT gated heart is scheduled for 8/21/24.  Patient's mother is frustrated because she does not think patient needs this test.  Please advise.  Thank you!

## 2024-08-12 NOTE — TELEPHONE ENCOUNTER
Spoke to pt, she is getting treadmill stress test tomorrow and following up with [facility] on 8/26/24. She will work on scheduling MRI as well. She will have results forwarded to us from stress test. She needs form prior to season starting in Sept so she requests form to be completed asap once you have all appropriate info. I can flag this encounter for 8/26 when she has cards appointment to follow up if needed for records. Fyi thanks!

## 2024-08-13 ENCOUNTER — TELEPHONE (OUTPATIENT)
Dept: NEUROLOGY | Facility: CLINIC | Age: 19
End: 2024-08-13

## 2024-08-13 NOTE — TELEPHONE ENCOUNTER
Per Dr Heath, patient may be offered any cancellation appointments between now and August 27 for EMG, BLE      Routed to  to inform.  Patient informed via [employer].

## 2024-08-13 NOTE — TELEPHONE ENCOUNTER
Patient's mom called, daughter Peggy needs a sports physical as she is a . PCP won't sign the physical until Dr. Heath clears her to play. But her daughter has never had restrictions in the first place and is encouraged to exercise. Please call Miriam (patient's mom) back how to proceed.  Patient's mom is inquiring if it is necessary to do the more invasive testing that has been ordered. Tests ordered were MRI, EEG, EMG. Please call back to discuss.    Please call mom (Miriam) at [phone] if no answer call  at [phone] (Marcelo) to discuss.

## 2024-08-13 NOTE — TELEPHONE ENCOUNTER
Spoke with pt's mom.   She is frustrated as stress test was cancelled by cardiology after having been scheduled for weeks. Was told to have the CT gated pulmonary vein first.   She is frustrated about why she needs this test when cardiology actually told pt to keep exercising and following her normal activities.   Advised I cannot answer that question. Advised that d dimer was negative so risk of PE is low and per cardiology note that is what they were looking for. Recommend that she make an appt with cardiology to discuss further especially given that pt's symptoms have improved significantly to see if she still needs this testing. Advised will need clearance from cardiology to participate in swimming.   Also provided rationale for needing to do the MRI/MRA of the brain. If negative and if pt still feeing better I can then discuss with neuro if other tests ordered by them still need to happen.   Pt's mother verbalized understanding and was appreciative of the call.

## 2024-08-13 NOTE — TELEPHONE ENCOUNTER
Spoke to patient's mother Miriam to explain that all testing ordered is needed per provider, to rule out differential diagnoses to find out why patient is having symptoms.   Advised that provider would like all testing completed before he is comfortable giving a clearance for sports.  Mother states patient goes to school in [place] and is in swimming sport.  MRI is scheduled 8/22/2204  EEG is not scheduled, provided central scheduling number for mother to schedule.  EMG scheduled for 8/27/2024 with Dr Heath.    Patient and family are interested in a sooner EMG appointment if available.  Nothing available sooner at this time, will consult with provider.

## 2024-08-22 ENCOUNTER — HOSPITAL ENCOUNTER (OUTPATIENT)
Dept: MRI IMAGING | Age: 19
Discharge: HOME | End: 2024-08-22
Attending: Other
Payer: COMMERCIAL

## 2024-08-22 DIAGNOSIS — R42 DIZZINESS AND GIDDINESS: ICD-10-CM

## 2024-08-22 DIAGNOSIS — R20.2 PARESTHESIAS: ICD-10-CM

## 2024-08-22 DIAGNOSIS — R00.2 PALPITATIONS: ICD-10-CM

## 2024-08-22 DIAGNOSIS — R11.0 NAUSEA: ICD-10-CM

## 2024-08-22 DIAGNOSIS — R55 POSTURAL DIZZINESS WITH PRESYNCOPE: ICD-10-CM

## 2024-08-22 DIAGNOSIS — R42 POSTURAL DIZZINESS WITH PRESYNCOPE: ICD-10-CM

## 2024-08-22 PROCEDURE — 70553 MRI BRAIN STEM W/O & W/DYE: CPT | Performed by: OTHER

## 2024-08-22 PROCEDURE — 70549 MR ANGIOGRAPH NECK W/O&W/DYE: CPT | Performed by: OTHER

## 2024-08-22 PROCEDURE — 70544 MR ANGIOGRAPHY HEAD W/O DYE: CPT | Performed by: OTHER

## 2024-08-22 PROCEDURE — A9575 INJ GADOTERATE MEGLUMI 0.1ML: HCPCS | Performed by: OTHER

## 2024-08-22 RX ORDER — GADOTERATE MEGLUMINE 376.9 MG/ML
15 INJECTION INTRAVENOUS
Status: COMPLETED | OUTPATIENT
Start: 2024-08-22 | End: 2024-08-22

## 2024-08-22 RX ADMIN — GADOTERATE MEGLUMINE 12 ML: 376.9 INJECTION INTRAVENOUS at 19:33:00

## 2024-08-26 ENCOUNTER — NURSE ONLY (OUTPATIENT)
Dept: ELECTROPHYSIOLOGY | Facility: HOSPITAL | Age: 19
End: 2024-08-26
Attending: Other
Payer: COMMERCIAL

## 2024-08-26 DIAGNOSIS — R20.2 PARESTHESIAS: ICD-10-CM

## 2024-08-26 DIAGNOSIS — R42 DIZZINESS AND GIDDINESS: ICD-10-CM

## 2024-08-26 DIAGNOSIS — R00.2 PALPITATIONS: ICD-10-CM

## 2024-08-26 DIAGNOSIS — R11.0 NAUSEA: ICD-10-CM

## 2024-08-26 PROCEDURE — 95819 EEG AWAKE AND ASLEEP: CPT

## 2024-08-27 ENCOUNTER — PROCEDURE VISIT (OUTPATIENT)
Dept: NEUROLOGY | Facility: CLINIC | Age: 19
End: 2024-08-27
Payer: COMMERCIAL

## 2024-08-27 DIAGNOSIS — R20.2 PARESTHESIAS: Primary | ICD-10-CM

## 2024-08-27 PROCEDURE — 95886 MUSC TEST DONE W/N TEST COMP: CPT | Performed by: OTHER

## 2024-08-27 PROCEDURE — 95910 NRV CNDJ TEST 7-8 STUDIES: CPT | Performed by: OTHER

## 2024-08-27 NOTE — PROCEDURES
[address] [facility] [place] [address]  [place] [address]   [phone]        Full Name: ALLEN SOTOMAYOR Gender: Female  Patient ID: [id number] YOB: 2005      Visit Date: 8/27/2024 8:08 AM  Age: 19 Years  Examining Physician: GREG ARZATE MD  Height: 5 feet 6 inch  Weight: 132 lbs  History:     Focused HPI:   This is a 19 year old female who presents for evaluation of dizziness/ light headed sensation, palpitations, and nausea.    Patient states starting May 2024 she began to notice palpitations at rest when she was sitting in class.  This was also associated with some nausea but progressed since she moved back home to the point she is having frequent light headed sensation along with feeling like she is going to pass out when she stands up or when she is sitting up; this improves slightly if gets up slower.  She notes heart rate may get up to \"120\" when walking to bathroom and her resting heart rate is in the 60s or 70s..     She also endorses frequent nausea and light headed sensation; NCS/EMG requested to rule out atypical neuropathy.     Focused Exam:   Motor: 5/5 strength throughout  DTR: 2+, symmetric, throughout, toes downgoing bilaterally; no clonus  Sensory: intact, to pin, vibration throughout       Sensory NCS      Nerve / Sites Rec. Site Onset Lat Peak Lat NP Amp PP Amp Segments Distance Velocity Comment     ms ms µV µV  cm m/s    R Median - Dig II (Antidromic)      Wrist Index 2.66 3.49 56.8 93.7 Wrist - Index 14 53       Ref.  ?3.30 ?4.00 ?17.0 ?19.0 Ref.  ?40    R Ulnar - Dig V (Antidromic)      Wrist Dig V 2.14 3.07 48.2 58.2 Wrist - Dig V 11 52       Ref.  ?3.10 ?4.00 ?14.0 ?13.0 Ref.      R Radial - Superficial (Antidromic)      Forearm Wrist 1.98 2.60 33.6 38.2 Forearm - Wrist 10 51       Ref.  ?2.20 ?2.80 ?7.0 ?11.0 Ref.      R Sural - (Antidromic)      Calf Ankle 3.18 4.06 15.4 20.9 Calf - Ankle 14 44       Ref.  ?3.60 ?4.50 ?4.0 ?4.0  Ref.          Motor NCS      Nerve / Sites Muscle Latency Amplitude Segments Dist. Lat Diff Velocity Comments     ms mV  cm ms m/s    R Median - APB      Wrist APB 3.46 10.8 Wrist - APB 7         Ref.  ?4.40 ?5.9 Ref.          Elbow APB 7.13 10.8 Elbow - Wrist 22 3.67 60.0       Ref.    Ref.   ?53.0    R Ulnar - ADM      Wrist ADM 2.81 13.9 Wrist - ADM 7         Ref.  ?3.70 ?7.9 Ref.          B.Elbow ADM 6.15 12.5 B.Elbow - Wrist 19.5 3.33 58.5       Ref.    Ref.   ?52.0    R Peroneal - EDB      Ankle EDB 5.42 5.9 Ankle - EDB 7         Ref.  ?6.50 ?2.6 Ref.          B. Fib Head EDB 12.81 5.5 B. Fib Head - Ankle 33 7.40 44.6       Ref.    Ref.   ?43.0    R Tibial - AH      Ankle AH 4.31 17.9 Ankle - AH 8         Ref.  ?6.10 ?5.8 Ref.          Knee AH 12.83 15.0 Knee - Ankle 39 8.52 45.8       Ref.    Ref.   ?42.0        F  Wave      Nerve M Latency F Latency    ms ms   R Peroneal - EDB 6.0 53.5   Ref.  ?54.5   R Tibial - AH 5.0 50.9   Ref.  ?53.2   R Median - APB 3.9 24.4   Ref.  ?28.5   R Ulnar - ADM 2.7 25.3   Ref.  ?30.2       EMG Summary Table     Spontaneous MUAP Recruitment   Muscle IA Fib PSW Fasc H.F. Amp Dur. PPP Pattern   R. Deltoid N None None None None N N N N   R. Triceps brachii N None None None None N N N N   R. Biceps brachii N None None None None N N N N   R. Extensor digitorum communis N None None None None N N N N   R. First dorsal interosseous N None None None None N N N N   R. Vastus medialis N None None None None N N N N   R. Peroneus longus N None None None None N N N N   R. Tibialis anterior N None None None None N N N N   R. Gastrocnemius N None None None None N N N N   R. Extensor hallucis longus N None None None None N N N N       Summary    The motor conduction test was normal in all 4 of the tested nerves: R Median - APB, R Ulnar - ADM, R Peroneal - EDB, R Tibial - AH.    The sensory conduction test was normal in all 4 of the tested nerves: R Median - Dig II (Antidromic), R Ulnar - Dig V  (Antidromic), R Radial - Superficial (Antidromic), R Sural - (Antidromic).    The F wave study was unremarkable in all 4 of the tested nerves: R Peroneal - EDB, R Tibial - AH, R Median - APB, R Ulnar - ADM    The needle EMG study was normal in all 10 tested muscles: R. Deltoid, R. Triceps brachii, R. Biceps brachii, R. Extensor digitorum communis, R. First dorsal interosseous, R. Vastus medialis, R. Peroneus longus, R. Tibialis anterior, R. Gastrocnemius, R. Extensor hallucis longus.      Conclusion:   This is a normal study. There is no electrophysiologic evidence to suggest a large fiber polyneuropathy, primary demyelinating neuropathy or myopathy. Of note, this test would not rule out a small fiber neuropathy or central etiology and clinical correlation is advised.    Tristin Heath MD, Neurology  [facility]  Pager [phone]  8/27/2024

## 2024-08-29 ENCOUNTER — TELEPHONE (OUTPATIENT)
Dept: INTERNAL MEDICINE CLINIC | Facility: CLINIC | Age: 19
End: 2024-08-29

## 2024-08-29 NOTE — TELEPHONE ENCOUNTER
Patient states Dr Russell had to wait until her Cardiology test was resulted in order to sign off on her school physical. She says the results came in and she would like to know when she can come in and pick it up.

## 2024-08-29 NOTE — TELEPHONE ENCOUNTER
Pt completed EMG testing as well with neuro, updated Care Everywhere and notes with [facility] with LITO Olmedo are available for review, they did note:1. Dyspnea exertion, palpitations, lightheadedness and nausea: Symptoms have resolved with adequate hydration. Most recent ETT was negative for ischemia or exercise-induced arrhythmias. Echocardiogram showed normal EF and 48-hour Holter was negative for arrhythmias. Patient has no cardiac contraindications that would limit her from participating in school sports or activities.  Plan:  1. Follow up as needed.  2. Please send OV to PCP-Dr. Russell. RE: Sports physical-Patient has no cardiac contraindications that would limit her from participating in school sports or activities.     I believe you still have her form, are you able to sign? Thanks!!

## 2024-08-30 NOTE — TELEPHONE ENCOUNTER
Can we try to call neuro to see if they have any contraindications for school sports? I have sent a staff message and a secure epic message to dr. Hetah between yesterday and today and have not heard back from him.

## 2024-08-30 NOTE — TELEPHONE ENCOUNTER
Called neurology, they advised 's office is closed/phones are off but there is still an RN in the office, they will reach out to RN and see if they can have  reach out.  informed.

## 2024-08-30 NOTE — TELEPHONE ENCOUNTER
Patient calling to see if school physical will be signed today because she has to leave for school tomorrow.

## 2024-08-30 NOTE — TELEPHONE ENCOUNTER
Note from dr. Heath as below:   Dandy John, I read the EEG today; official report is in the chart - sorry had not looked at it yet and needed to check before giving the all clear - all good from my view  -Thanks     I completed and signed my portion of the form.   She should review her portion of the form and make sure it has been filled out accurately since there have been some new symptoms this summer.     Good luck to her for the school year!

## 2024-10-21 ENCOUNTER — LAB ENCOUNTER (OUTPATIENT)
Dept: LAB | Age: 19
End: 2024-10-21
Payer: COMMERCIAL

## 2024-10-21 DIAGNOSIS — D72.818 OTHER DECREASED WHITE BLOOD CELL COUNT: ICD-10-CM

## 2024-10-21 DIAGNOSIS — D89.9 DISEASE OF IMMUNE SYSTEM (HCC): Primary | ICD-10-CM

## 2024-10-21 PROCEDURE — 86038 ANTINUCLEAR ANTIBODIES: CPT

## 2024-10-21 PROCEDURE — 86225 DNA ANTIBODY NATIVE: CPT

## 2024-10-21 PROCEDURE — 86644 CMV ANTIBODY: CPT

## 2024-10-21 PROCEDURE — 86747 PARVOVIRUS ANTIBODY: CPT

## 2024-10-21 PROCEDURE — 86738 MYCOPLASMA ANTIBODY: CPT

## 2024-10-21 PROCEDURE — 87533 HHV-6 DNA QUANT: CPT

## 2024-10-21 PROCEDURE — 36415 COLL VENOUS BLD VENIPUNCTURE: CPT

## 2024-10-22 LAB
CMV IGG AB: <0.6 U/ML
M PNEUMO IGG ABS: 1432 U/ML
M PNEUMO IGM ABS: <770 U/ML

## 2024-10-23 LAB
DSDNA IGG SERPL IA-ACNC: 1.6 IU/ML
ENA AB SER QL IA: <0.09 UG/L
ENA AB SER QL IA: NEGATIVE
PARVO B19 IGG: 0.3 INDEX
PARVO B19 IGM: 0.1 INDEX

## 2024-10-25 LAB — Lab: NEGATIVE COPIES/ML

## 2024-11-06 ENCOUNTER — TELEPHONE (OUTPATIENT)
Dept: INTERNAL MEDICINE CLINIC | Facility: CLINIC | Age: 19
End: 2024-11-06

## 2024-11-06 NOTE — TELEPHONE ENCOUNTER
Patient's mother brought in a Medical Health Statement for patient.  Would Dr Russell please complete the form?  Please call patient's mother to  the completed form.  Form placed in triage incoming bin.

## 2025-04-14 ENCOUNTER — TELEPHONE (OUTPATIENT)
Dept: INTERNAL MEDICINE CLINIC | Facility: CLINIC | Age: 20
End: 2025-04-14

## 2025-04-14 NOTE — TELEPHONE ENCOUNTER
It wasn't a recommendation for everyone in the past. It was a recommendation for specific patients at high risk for it. Now they recommend shared clinical decision making for pts not at high risk for it.   I am okay with her getting it, however we do not carry it here.   She can get the bexsero or trumenba, both are 2 dose series.   She can get it at her local pharmacy.

## 2025-04-14 NOTE — TELEPHONE ENCOUNTER
Patient's mother said her daughter is receiving messages through [employer] about her never having the meningococcal B vaccine.  They are wondering why this hasn't been brought up during her annual visits.  Please advise.  Thank you!

## 2025-04-14 NOTE — TELEPHONE ENCOUNTER
This is an auto-reminder from [email or web address] w/ care gaps. Please advise- was this deferred previously d/t her symptoms at the time? Would you recommend meningococcal vaccine w/ pharmacy now? Thanks!

## 2025-04-15 NOTE — TELEPHONE ENCOUNTER
Patient notified. Patient verbalized understanding     [facility]

## 2025-06-17 ENCOUNTER — OFFICE VISIT (OUTPATIENT)
Dept: INTERNAL MEDICINE CLINIC | Facility: CLINIC | Age: 20
End: 2025-06-17
Payer: COMMERCIAL

## 2025-06-17 VITALS
RESPIRATION RATE: 16 BRPM | OXYGEN SATURATION: 99 % | SYSTOLIC BLOOD PRESSURE: 100 MMHG | DIASTOLIC BLOOD PRESSURE: 60 MMHG | HEART RATE: 85 BPM | HEIGHT: 66.18 IN | WEIGHT: 139.31 LBS | TEMPERATURE: 97 F | BODY MASS INDEX: 22.39 KG/M2

## 2025-06-17 DIAGNOSIS — Z00.00 ENCOUNTER FOR ANNUAL PHYSICAL EXAM: Primary | ICD-10-CM

## 2025-06-17 DIAGNOSIS — Z13.220 SCREENING FOR CHOLESTEROL LEVEL: ICD-10-CM

## 2025-06-17 DIAGNOSIS — Z13.29 SCREENING FOR THYROID DISORDER: ICD-10-CM

## 2025-06-17 PROCEDURE — 99395 PREV VISIT EST AGE 18-39: CPT | Performed by: NURSE PRACTITIONER

## 2025-06-17 NOTE — PATIENT INSTRUCTIONS
Men B as needed if you want    Flu shot recommended.  Mid October if you would like.    Get your labs done. You should be fasting for at least 10 hours. If you take a multivitamin with Biotin or any biotin product it should be held for 3 days prior to getting your labs done.     Follow up in 1 year or sooner as needed

## 2025-06-17 NOTE — PROGRESS NOTES
The following individual(s) verbally consented to be recorded using ambient AI listening technology and understand that they can each withdraw their consent to this listening technology at any point by asking the clinician to turn off or pause the recording:    Patient name: Peggy Sprague      CHIEF COMPLAINT   Well woman exam    HPI:   Peggy Sprague is a 20 year old female who presents for a complete physical exam.      Wt Readings from Last 6 Encounters:   06/17/25 139 lb 4.8 oz (63.2 kg)   08/05/24 132 lb 4.8 oz (60 kg)   07/31/24 134 lb (60.8 kg)   06/12/24 134 lb (60.8 kg)   05/31/24 137 lb (62.1 kg)   05/28/24 136 lb 11.2 oz (62 kg)     Body mass index is 22.36 kg/m².     Diet and exercise are good. Vaccines reviewed. Wearing seat belt and no texting and driving. Feels safe at home. Pap due-does not want it.  Has never had intercourse. No smoking. Occasional alcohol. No drinking and driving. No skin concerns. Sees a dentist routinely. Labs to be ordered/reviewed.      Cholesterol, Total (mg/dL)   Date Value   05/29/2024 135     HDL Cholesterol (mg/dL)   Date Value   05/29/2024 51     LDL Cholesterol (mg/dL)   Date Value   05/29/2024 69     AST (U/L)   Date Value   05/29/2024 17   07/18/2022 20     ALT (U/L)   Date Value   05/29/2024 16   07/18/2022 21           Current Medications[1]   Past Medical History[2]   Past Surgical History[3]   Family History[4]   Social History:   Short Social Hx on File[5]      REVIEW OF SYSTEMS:   GENERAL: feels well otherwise  SKIN: no complaint of any unusual skin lesions  EYES: no complaint of blurred vision or double vision  HEENT: no complaint of nasal congestion, sinus pain or ST  LUNGS: no complaint of shortness of breath with exertion  CARDIOVASCULAR: no complaint of chest pain on exertion  GI: no complaint of pain,denies heartburn  : No complains of dysuria or vaginal discharge  MUSCULOSKELETAL: no complaint of back pain  NEURO: no complaint of  headaches  PSYCHE: no complaint of depression or anxiety  HEMATOLOGIC: denies hx of anemia    EXAM:   /60 (BP Location: Left arm, Patient Position: Sitting, Cuff Size: adult)   Pulse 85   Temp 97.1 °F (36.2 °C) (Temporal)   Resp 16   Ht 5' 6.18\" (1.681 m)   Wt 139 lb 4.8 oz (63.2 kg)   LMP 07/04/2024 (Exact Date)   SpO2 99%   BMI 22.36 kg/m²   Body mass index is 22.36 kg/m².   GENERAL: well developed, well nourished,in no apparent distress  SKIN: no rashes, no suspicious lesions  HEENT: atraumatic, normocephalic,ears are clear  EYES:PERRLA, EOMI,conjunctiva are clear  NECK: supple,no adenopathy, no thyromegaly  BREAST: no dominant or suspicious mass  LUNGS: clear to auscultation  CARDIO: RRR without murmur  GI: no tenderness or masses   : Declined by patient  MUSCULOSKELETAL: back is not tender, FROM of the back  EXTREMITIES: no edema or calve tenderness   NEURO: Oriented times three,cranial nerves are intact,motor and sensory are grossly intact    LABS:     Lab Results   Component Value Date    WBC 5.4 05/29/2024    RBC 4.41 05/29/2024    HGB 13.0 05/29/2024    HCT 37.0 05/29/2024    MCV 83.9 05/29/2024    MCH 29.5 05/29/2024    MCHC 35.1 05/29/2024    RDW 12.0 05/29/2024    .0 05/29/2024      Lab Results   Component Value Date    GLU 95 05/29/2024    BUN 12 05/29/2024    CREATSERUM 0.89 05/29/2024    ANIONGAP 7 05/29/2024    GFRNAA 108 07/18/2022    GFRAA 124 07/18/2022    CA 9.5 05/29/2024    OSMOCALC 288 05/29/2024    ALKPHO 53 05/29/2024    AST 17 05/29/2024    ALT 16 05/29/2024    BILT 0.6 05/29/2024    TP 7.5 05/29/2024    ALB 4.2 05/29/2024    GLOBULIN 3.3 05/29/2024     05/29/2024    K 4.2 05/29/2024     05/29/2024    CO2 23.0 05/29/2024      Lab Results   Component Value Date    CHOLEST 135 05/29/2024    TRIG 74 05/29/2024    HDL 51 05/29/2024    LDL 69 05/29/2024    VLDL 11 05/29/2024    NONHDLC 84 05/29/2024      Lab Results   Component Value Date    TSH 1.580  05/29/2024      No results found for: \"EAG\", \"A1C\"    IMAGING:     No results found.     ASSESSMENT AND PLAN:   1. Encounter for annual physical exam  - Peggy Sprague is a 20 year old female who presents for a complete physical exam.  Pap and pelvic declined by patient.  No concern for STDs.  Has never had intercourse previously. Self breast exam explained. Pt' s Body mass index is 22.36 kg/m²., recommended regular exercise. Labs ordered/reviewed. Vaccines reviewed.      2. Screening for cholesterol level  - Lipid Panel    3. Screening for thyroid disorder  - TSH W Reflex To Free T4     The patient indicates understanding of these issues and agrees to the plan.  The patient is asked to return for CPX in 1 year.         [1]   Current Outpatient Medications   Medication Sig Dispense Refill    Vitamin D-Vitamin K (VITAMIN K2-VITAMIN D3 OR)       Omega-3 Fatty Acids (OMEGA-3 CF OR)      [2]   Past Medical History:   History of MRI    Right hip MRI    Perthes disease, right (HCC)   [3]   Past Surgical History:  Procedure Laterality Date    Legg perthes orthosis Right 2011, 2012, 2021    3 total surgeries   [4]   Family History  Problem Relation Age of Onset    Breast Cancer Paternal Grandmother     Cancer Paternal Grandmother     Other (leukimia) Paternal Grandmother 62   [5]   Social History  Socioeconomic History    Marital status: Single   Occupational History    Occupation:    Tobacco Use    Smoking status: Never     Passive exposure: Never    Smokeless tobacco: Never   Vaping Use    Vaping status: Never Used   Substance and Sexual Activity    Alcohol use: Never    Drug use: Never   Other Topics Concern    Caffeine Concern No    Exercise No    Seat Belt No    Special Diet No    Stress Concern No    Weight Concern No     Social Drivers of Health     Food Insecurity: No Food Insecurity (6/17/2025)    NCSS - Food Insecurity     Worried About Running Out of Food in the Last Year: No     Ran Out of Food  in the Last Year: No   Transportation Needs: No Transportation Needs (6/17/2025)    NCSS - Transportation     Lack of Transportation: No   Housing Stability: Not At Risk (6/17/2025)    NCSS - Housing/Utilities     Has Housing: Yes     Worried About Losing Housing: No     Unable to Get Utilities: No

## 2025-06-18 ENCOUNTER — MED REC SCAN ONLY (OUTPATIENT)
Dept: INTERNAL MEDICINE CLINIC | Facility: CLINIC | Age: 20
End: 2025-06-18

## 2025-06-25 LAB
CHOL/HDLC RATIO: 2.6 (CALC)
CHOLESTEROL, TOTAL: 141 MG/DL
HDL CHOLESTEROL: 54 MG/DL
LDL-CHOLESTEROL: 73 MG/DL (CALC)
NON-HDL CHOLESTEROL: 87 MG/DL (CALC)
TRIGLYCERIDES: 61 MG/DL
TSH W/REFLEX TO FT4: 1.48 MIU/L

## (undated) NOTE — LETTER
08/02/24    Dear Dr. Dora Russell      Thank you for referring your patient, Peggy Sprague to me for an evaluation.  Please see my initial consult note enclosed below.  Let me know if you have any questions.    Thank you  Tristin Heath MD, Neurology  [facility]  Pager [phone]  Office:    [address] [place] [id number]  [phone]      8/2/2024    [facility] New Patient / Consult Visit    Peggy Sprague is a 19 year old female.                         Referring MD: Dora Russell    Chief Complaint   Patient presents with    Neurologic Problem     Referred by PCP, C/O palpitations/dizziness/lightheaded/\"shakey\"/nausea, notes Sx's related to orthostatic changes, LOV Cards feels \"not cardiac related\"    Test Results     Echo 6/4/24, Holter 6/8/24, US Carotid 6/22       HPI:    Peggy Sprague is a 19 year old, who presents for evaluation of dizziness/ light headed sensation, palpitations, and nausea.    Patient states starting May 2024 she began to notice palpitations at rest when she was sitting in class.  This was also associated with some nausea but progressed since she moved back home to the point she is having frequent light headed sensation along with feeling like she is going to pass out when she stands up or when she is sitting up; this improves slightly if gets up slower.  She notes heart rate may get up to \"120\" when walking to bathroom and her resting heart rate is in the 60s or 70s..     She also endorses frequent nausea and light headed sensation.   She denies balance issues or numbness in feet but has occasional tingling in hands. She denies history of concussion or waking in AM having wet the bed or bitten her tongue. She has intermittent aching over the outside of the chest; denies bowel / bladder incontinence; denies headaches upon standing or with exertion.   Otherwise, patient denies any recent weight change, fevers, chills,  nausea, double vision/ blurry vision / loss of vision, chest pain, palpitations, shortness of breath, rashes, joint pains, bowel / bladder incontinence or mood issues.     Past Medical History:    History of MRI    Right hip MRI    Perthes disease, right (HCC)     Past Surgical History:   Procedure Laterality Date    Legg perthes orthosis Right 2011, 2012, 2021    3 total surgeries     Social History     Socioeconomic History    Marital status: Single   Occupational History    Occupation:    Tobacco Use    Smoking status: Never     Passive exposure: Never    Smokeless tobacco: Never   Vaping Use    Vaping status: Never Used   Substance and Sexual Activity    Alcohol use: Never    Drug use: Never   Other Topics Concern    Caffeine Concern No    Exercise No    Seat Belt No    Special Diet No    Stress Concern No    Weight Concern No     Family History   Problem Relation Age of Onset    Breast Cancer Paternal Grandmother     Cancer Paternal Grandmother     Other (leukimia) Paternal Grandmother 62       Allergies:  No Known Allergies   Current Meds:  Current Outpatient Medications   Medication Sig Dispense Refill    dimenhyDRINATE 50 MG Oral Tab Take 1 tablet (50 mg total) by mouth nightly as needed.      Glucosamine-Chondroitin (GLUCOSAMINE CHONDR COMPLEX OR) Take 2 capsules by mouth daily.      Multiple Vitamins-Minerals (MULTI COMPLETE OR) Take by mouth.            ROS:   A comprehensive 10 point review of systems was completed.  Pertinent positives and negatives noted in the the HPI.      PHYSICAL EXAM:   BP 96/56 (BP Location: Left arm, Patient Position: Sitting, Cuff Size: adult)   Pulse 78   Resp 16   Ht 66\"   Wt 134 lb (60.8 kg)   LMP 07/04/2024 (Exact Date)   BMI 21.63 kg/m²   Estimated body mass index is 21.63 kg/m² as calculated from the following:    Height as of this encounter: 66\".    Weight as of this encounter: 134 lb (60.8 kg).    GENERAL: well developed, well nourished, in no apparent  distress  SKIN: no rashes  EYES: sclera anicteric, conjunctiva normal  HEENT: normocephalic  CARDIOVASCULAR: S1, S2 normal, RRR  LUNGS: clear to auscultation bilaterally  EXTREMITIES: no cyanosis, peripheral pulses intact    Neck: Supple; full range of motion; no carotid bruits    Mental status:  Alert and oriented to time, place, person, and situation  Speech: fluent  Language: normal naming, repetition, and comprehension  Memory: normal  Attention/concentration: normal    Fundoscopic Exam: optic discs sharp bilaterally    Cranial Nerves: II through XII  Optic:    Pupils: equally round and reactive to light with direct and consensual responses, normal accomodation   Visual acuity: Normal              Visual fields: Normal  Oculomotor/Trochlear/Abducens:    Eye Movements: EOMI without nystagmus  Trigeminal:   Facial sensation:intact to light touch bilaterally  Facial:   Smile symmetric, eyebrow raise symmetric  Vestibulocochlear:   Hearing: normal bilaterally  Glossopharyngeal/Vagus:   Palate elevates symmetrically with midline uvula  Spinal accessory:   Shoulder Shrug: normal bilaterally   Lateral head turn: normal bilaterally  Hypoglossal:   Tongue movement: protrusion is midline with normal lateral movements    Motor System:  Strength: 5/5 throughout  Tone: normal    Sensory:  Pin is normal  Vibration is normal  Proprioception is normal  Romberg is absent    Coordination:  Finger to nose normal bilaterally  Rapid alternating movements normal bilaterally  Heel to shin is normal bilaterally    DTRs:   2+, symmetric, throughout, toes downgoing biaterally; no clonus          Gait:  Normal casual, heel, toe and tandem gait    TEST RESULTS/DATA REVIEWED:   Reviewed in EMR    IMPRESSION AND PLAN:   Peggy Sprague is a 19 year old female who presents for evaluation of dizziness/ light headed sensation, palpitations, and nausea.    Patient states starting May 2024 she began to notice palpitations at rest when she was  sitting in class.  This was also associated with some nausea but progressed since she moved back home to the point she is having frequent light headed sensation along with feeling like she is going to pass out when she stands up or when she is sitting up; this improves slightly if gets up slower.  She notes heart rate may get up to \"120\" when walking to bathroom and her resting heart rate is in the 60s or 70s..     She also endorses frequent nausea and light headed sensation.   She denies balance issues or numbness in feet but has occasional tingling in hands. She denies history of concussion or waking in AM having wet the bed or bitten her tongue. She has intermittent aching over the outside of the chest; denies bowel / bladder incontinence; denies headaches upon standing or with exertion.       Neurologic exam is normal and non-focal.     Overall, symptoms concerning for cardiac etiology vs autonomic dysfunction vs isolated postural tacyhycardic syndrome. Cardiac workup has been unremarkable. She could have symptoms of small fiber neuropathy and seizure is also in differential; will check NCS/EMG to rule out atypical neuropathy. Given dizziness and presyncope, will check MRI brain / MRA head/neck to evaluate for possible secondary pathology / demyelinating changes or vascular stenosis; check EEG to rule out seizures.      In addition, with suspected POTS syndrome, diagnostic workup would include tilt table test and /or autonomic testing; advised patient to discuss with cardiology workup and will give referral to autonomic neuropathy specialist Dr. Manzo as well.      1. Paresthesias  As noted above   - MRI BRAIN MRA BRAIN+MRA NECK (ALL W+WO) (CPT=70553/27640/71128); Future  - EEG; Future  - EMG ([facility]); Future  - Refer to Neurology    2. Dizziness and giddiness  As noted above   - MRI BRAIN MRA BRAIN+MRA NECK (ALL W+WO) (CPT=70553/57821/89607); Future  - EEG; Future  - Refer to Neurology    3.  Palpitations  As noted above   - MRI BRAIN MRA BRAIN+MRA NECK (ALL W+WO) (CPT=70553/29764/36202); Future  - EEG; Future  - Refer to Neurology    4. Nausea  As noted above   - MRI BRAIN MRA BRAIN+MRA NECK (ALL W+WO) (CPT=70553/82680/26439); Future  - EEG; Future  - Refer to Neurology    5. Postural dizziness with presyncope  As noted above   - MRI BRAIN MRA BRAIN+MRA NECK (ALL W+WO) (CPT=70553/22476/60972); Future  - Refer to Neurology    Copy of note was sent to referring physician.      No follow-ups on file.    Tristin Heath MD, Neurology  [facility]  Pager [phone]  7/31/2024